# Patient Record
Sex: MALE | Race: BLACK OR AFRICAN AMERICAN | NOT HISPANIC OR LATINO
[De-identification: names, ages, dates, MRNs, and addresses within clinical notes are randomized per-mention and may not be internally consistent; named-entity substitution may affect disease eponyms.]

---

## 2017-01-18 ENCOUNTER — APPOINTMENT (OUTPATIENT)
Dept: COLORECTAL SURGERY | Facility: CLINIC | Age: 62
End: 2017-01-18

## 2017-01-18 VITALS
HEIGHT: 73 IN | RESPIRATION RATE: 16 BRPM | HEART RATE: 80 BPM | BODY MASS INDEX: 31.68 KG/M2 | DIASTOLIC BLOOD PRESSURE: 80 MMHG | WEIGHT: 239 LBS | SYSTOLIC BLOOD PRESSURE: 142 MMHG | TEMPERATURE: 98.1 F

## 2017-01-30 ENCOUNTER — NON-APPOINTMENT (OUTPATIENT)
Age: 62
End: 2017-01-30

## 2017-01-30 ENCOUNTER — APPOINTMENT (OUTPATIENT)
Dept: CARDIOLOGY | Facility: CLINIC | Age: 62
End: 2017-01-30

## 2017-01-30 VITALS
DIASTOLIC BLOOD PRESSURE: 97 MMHG | HEIGHT: 73 IN | OXYGEN SATURATION: 98 % | SYSTOLIC BLOOD PRESSURE: 169 MMHG | HEART RATE: 77 BPM | BODY MASS INDEX: 31.68 KG/M2 | WEIGHT: 239 LBS

## 2017-01-30 VITALS — DIASTOLIC BLOOD PRESSURE: 91 MMHG | SYSTOLIC BLOOD PRESSURE: 153 MMHG

## 2017-02-08 ENCOUNTER — EMERGENCY (EMERGENCY)
Facility: HOSPITAL | Age: 62
LOS: 1 days | Discharge: HOME | End: 2017-02-08
Attending: EMERGENCY MEDICINE
Payer: COMMERCIAL

## 2017-02-08 VITALS
TEMPERATURE: 98 F | RESPIRATION RATE: 20 BRPM | OXYGEN SATURATION: 96 % | HEART RATE: 87 BPM | SYSTOLIC BLOOD PRESSURE: 148 MMHG | WEIGHT: 240.08 LBS | HEIGHT: 73 IN | DIASTOLIC BLOOD PRESSURE: 91 MMHG

## 2017-02-08 DIAGNOSIS — Z86.010 PERSONAL HISTORY OF COLONIC POLYPS: ICD-10-CM

## 2017-02-08 DIAGNOSIS — E78.5 HYPERLIPIDEMIA, UNSPECIFIED: ICD-10-CM

## 2017-02-08 DIAGNOSIS — Z98.890 OTHER SPECIFIED POSTPROCEDURAL STATES: ICD-10-CM

## 2017-02-08 DIAGNOSIS — M25.562 PAIN IN LEFT KNEE: ICD-10-CM

## 2017-02-08 DIAGNOSIS — Z98.83 FILTERING (VITREOUS) BLEB AFTER GLAUCOMA SURGERY STATUS: ICD-10-CM

## 2017-02-08 DIAGNOSIS — H40.11X0 PRIMARY OPEN-ANGLE GLAUCOMA, STAGE UNSPECIFIED: Chronic | ICD-10-CM

## 2017-02-08 PROCEDURE — 73562 X-RAY EXAM OF KNEE 3: CPT | Mod: 26,LT

## 2017-02-08 PROCEDURE — 99283 EMERGENCY DEPT VISIT LOW MDM: CPT

## 2017-02-08 PROCEDURE — 73562 X-RAY EXAM OF KNEE 3: CPT

## 2017-02-08 PROCEDURE — 99283 EMERGENCY DEPT VISIT LOW MDM: CPT | Mod: 25

## 2017-02-08 RX ORDER — CELECOXIB 200 MG/1
1 CAPSULE ORAL
Qty: 20 | Refills: 0 | OUTPATIENT
Start: 2017-02-08 | End: 2017-02-18

## 2017-02-08 RX ORDER — CELECOXIB 200 MG/1
1 CAPSULE ORAL
Qty: 14 | Refills: 0 | OUTPATIENT
Start: 2017-02-08 | End: 2017-02-15

## 2017-02-08 NOTE — ED PROVIDER NOTE - FAMILY HISTORY
Father  Still living? No  Family history of prostate cancer, Age at diagnosis: Age Unknown  Essential hypertension, Age at diagnosis: Age Unknown     Mother  Still living? Yes, Estimated age: Age Unknown  Essential hypertension, Age at diagnosis: Age Unknown  Family history of dementia, Age at diagnosis: Age Unknown

## 2017-02-08 NOTE — ED PROVIDER NOTE - MEDICAL DECISION MAKING DETAILS
62 yr old M presented to ED with left knee pain x 1.5 weeks. Examination + tenderness on palpation. No effusion or swelling noted on inspection . Normal ROM but with pain. X-ray negative for acute fracture or pathology. Pt refused pain medication and was seen by Orthopedic PA. Pt D/C in stable condition with Celebrex and will F/U with Orthopedic clinic as discussed.

## 2017-02-08 NOTE — ED PROVIDER NOTE - OBJECTIVE STATEMENT
62 yr old M presented to ED with left knee pain x 1.5 weeks. Pt explained that he has cartilage loss in both knees and usually see Dr. Carranza. Pt denies nay numbness, weakness or paraesthesia in his left knee. Pt denies any trauma to his knee. Pt also says that he took pain medication and applied anti inflammatory gel to his knee but it did not help his pain. 62 yr old M presented to ED with left knee pain x 1.5 weeks. Pt explained that he has cartilage loss in both knees and usually see Dr. Carranza. Pt denies any numbness, weakness or paraesthesia in his left knee. Pt denies any trauma to his knee. Pt also says that he took pain medication and applied anti inflammatory gel to his knee but it did not help his pain.

## 2017-02-08 NOTE — ED PROVIDER NOTE - PMH
Abdominal hernia without obstruction and without gangrene, recurrence not specified, unspecified hernia type    Anal fissure    Glaucoma    Hemorrhoids, unspecified hemorrhoid type    HLD (hyperlipidemia)    Other constipation    Primary osteoarthritis of both knees    Secondary cataract of left eye, unspecified secondary cataract type

## 2017-02-08 NOTE — ED PROVIDER NOTE - PROGRESS NOTE DETAILS
Pt sent for x-ray and Orthopedic AUDREY Granda has seen patient in Room . Jesus Alberto will notify me about treatment option.

## 2017-02-08 NOTE — ED PROVIDER NOTE - ATTENDING CONTRIBUTION TO CARE
Patient with left knee pain slight decrease range of motion on exam.  Patient able to bear weight in the ER.  Xray shows no acute pathology.  Patient instructed to follow-up with Orthopedics as outpatient.

## 2017-02-08 NOTE — ED PROVIDER NOTE - PHYSICAL EXAMINATION
Left Knee tenderness to palpation and pt is able to bear weight.   pain with ROM and normal pulses in extremity

## 2017-02-08 NOTE — ED PROVIDER NOTE - PSH
Chronic glaucoma  left 2012  right 2016   s/p trabeculectomy  H/O colonoscopy with polypectomy  2012  S/P Hernia Surgery  1974

## 2017-02-08 NOTE — CONSULT NOTE ADULT - SUBJECTIVE AND OBJECTIVE BOX
Pt Name: SINTIA CEDEÑO    MRN: [id number]    Patient is a 62y Male presenting to the emergency department with a chief complaint of left knee pain x 10 days  Patient states he has pain with ambulation and ROM, "has arthritis in both knees"  Patient denies any recent injury or trauma  Denies pain at rest, clicking or locking   Patient currently using voltaren gel and taking advil 200 mg for pain    HEALTH ISSUES - PROBLEM Dx:    REVIEW OF SYSTEMS    Musculoskeletal: pain with ROM	and ambulation	    ROS is otherwise negative.    PAST MEDICAL & SURGICAL HISTORY:  Prediabetes  HLD (hyperlipidemia)  Glaucoma  Chronic glaucoma: OS s/p trabeculectomy  S/P Hernia Surgery    Allergies: No Known Allergies  Medications:     FAMILY HISTORY:  : non-contributory    PHYSICAL EXAM:    Vital Signs Last 24 Hrs  T(C): 36.9, Max: 36.9 (02-08 @ 20:14)  T(F): 98.4, Max: 98.4 (02-08 @ 20:14)  HR: 87 (87 - 87)  BP: 148/91 (148/91 - 148/91)  BP(mean): --  RR: 20 (20 - 20)  SpO2: 96% (96% - 96%)  Daily Height in cm: 185.42 (08 Feb 2017 20:14)    Daily     Appearance: Alert, responsive, in no acute distress.    Neurological: Sensation is grossly intact to light touch. 5/5 motor function of all extremities. No focal deficits or weaknesses found.    Skin: no rash on visible skin. Skin is clean, dry and intact. No bleeding. No abrasions. No ulcerations.    Vascular: 2+ distal pulses. Cap refill < 2 sec. No signs of venous insufficiency or stasis. No extremity ulcerations. No cyanosis.    Musculoskeletal:         Left Lower Extremity: skin intact, no joint effusion, NTTP medial and lateral joint, no clinical signs of infection, 0-110 AROM with pain on terminal flexion, + active plantar/dorsiflexion, calf soft compressible/ NTTP, +2 DP pulse, sensation to light touch intact distally    Imaging Studies:   Xrays left knee negative for acute fracture or dislocation, mild degenerative changes       left knee joint injected with 40 mg depo medrol/10 cc lidocaine  Procedure performed under sterile technique  Patient tolerated well without complication, remained NVI    A/P:  Pt is a 62y Male with left knee pain    Plan per Dr. Benson  * Celebrex 200mg BID  * WBAT LLE  * no restriction with ROM  * compression sleeve as needed for comfort  * Follow up as outpatient with Dr. Benson

## 2017-02-13 ENCOUNTER — OTHER (OUTPATIENT)
Age: 62
End: 2017-02-13

## 2017-02-17 ENCOUNTER — OUTPATIENT (OUTPATIENT)
Dept: OUTPATIENT SERVICES | Facility: HOSPITAL | Age: 62
LOS: 1 days | Discharge: HOME | End: 2017-02-17
Payer: COMMERCIAL

## 2017-02-17 VITALS
TEMPERATURE: 98 F | DIASTOLIC BLOOD PRESSURE: 89 MMHG | HEART RATE: 68 BPM | RESPIRATION RATE: 14 BRPM | WEIGHT: 235.01 LBS | SYSTOLIC BLOOD PRESSURE: 152 MMHG | HEIGHT: 73 IN | OXYGEN SATURATION: 100 %

## 2017-02-17 DIAGNOSIS — Z98.890 OTHER SPECIFIED POSTPROCEDURAL STATES: Chronic | ICD-10-CM

## 2017-02-17 DIAGNOSIS — H40.11X0 PRIMARY OPEN-ANGLE GLAUCOMA, STAGE UNSPECIFIED: Chronic | ICD-10-CM

## 2017-02-17 DIAGNOSIS — K64.8 OTHER HEMORRHOIDS: ICD-10-CM

## 2017-02-17 DIAGNOSIS — K64.4 RESIDUAL HEMORRHOIDAL SKIN TAGS: ICD-10-CM

## 2017-02-17 DIAGNOSIS — Z12.11 ENCOUNTER FOR SCREENING FOR MALIGNANT NEOPLASM OF COLON: ICD-10-CM

## 2017-02-17 DIAGNOSIS — K60.2 ANAL FISSURE, UNSPECIFIED: ICD-10-CM

## 2017-02-17 LAB
ALBUMIN SERPL ELPH-MCNC: 4 G/DL — SIGNIFICANT CHANGE UP (ref 3.3–5)
ALP SERPL-CCNC: 79 U/L — SIGNIFICANT CHANGE UP (ref 40–120)
ALT FLD-CCNC: 19 U/L — SIGNIFICANT CHANGE UP (ref 12–78)
ANION GAP SERPL CALC-SCNC: 5 MMOL/L — SIGNIFICANT CHANGE UP (ref 5–17)
AST SERPL-CCNC: 23 U/L — SIGNIFICANT CHANGE UP (ref 15–37)
BASOPHILS # BLD AUTO: 0.1 K/UL — SIGNIFICANT CHANGE UP (ref 0–0.2)
BASOPHILS NFR BLD AUTO: 1.2 % — SIGNIFICANT CHANGE UP (ref 0–2)
BILIRUB DIRECT SERPL-MCNC: 0.2 MG/DL — SIGNIFICANT CHANGE UP (ref 0–0.2)
BILIRUB SERPL-MCNC: 0.9 MG/DL — SIGNIFICANT CHANGE UP (ref 0.2–1.2)
BUN SERPL-MCNC: 10 MG/DL — SIGNIFICANT CHANGE UP (ref 7–23)
CALCIUM SERPL-MCNC: 8.5 MG/DL — SIGNIFICANT CHANGE UP (ref 8.5–10.1)
CHLORIDE SERPL-SCNC: 108 MMOL/L — SIGNIFICANT CHANGE UP (ref 96–108)
CO2 SERPL-SCNC: 28 MMOL/L — SIGNIFICANT CHANGE UP (ref 22–31)
CREAT SERPL-MCNC: 1.1 MG/DL — SIGNIFICANT CHANGE UP (ref 0.5–1.3)
EOSINOPHIL # BLD AUTO: 0.1 K/UL — SIGNIFICANT CHANGE UP (ref 0–0.5)
EOSINOPHIL NFR BLD AUTO: 2.3 % — SIGNIFICANT CHANGE UP (ref 0–6)
GLUCOSE SERPL-MCNC: 98 MG/DL — SIGNIFICANT CHANGE UP (ref 70–99)
HBA1C BLD-MCNC: 4.8 % — SIGNIFICANT CHANGE UP (ref 4–5.6)
HCT VFR BLD CALC: 49.4 % — SIGNIFICANT CHANGE UP (ref 39–50)
HGB BLD-MCNC: 16.9 G/DL — SIGNIFICANT CHANGE UP (ref 13–17)
INR BLD: 1.2 RATIO — HIGH (ref 0.88–1.16)
LYMPHOCYTES # BLD AUTO: 1.1 K/UL — SIGNIFICANT CHANGE UP (ref 1–3.3)
LYMPHOCYTES # BLD AUTO: 27.6 % — SIGNIFICANT CHANGE UP (ref 13–44)
MCHC RBC-ENTMCNC: 31.1 PG — SIGNIFICANT CHANGE UP (ref 27–34)
MCHC RBC-ENTMCNC: 34.1 GM/DL — SIGNIFICANT CHANGE UP (ref 32–36)
MCV RBC AUTO: 91.2 FL — SIGNIFICANT CHANGE UP (ref 80–100)
MONOCYTES # BLD AUTO: 0.5 K/UL — SIGNIFICANT CHANGE UP (ref 0–0.9)
MONOCYTES NFR BLD AUTO: 12 % — SIGNIFICANT CHANGE UP (ref 2–14)
NEUTROPHILS # BLD AUTO: 2.3 K/UL — SIGNIFICANT CHANGE UP (ref 1.8–7.4)
NEUTROPHILS NFR BLD AUTO: 56.8 % — SIGNIFICANT CHANGE UP (ref 43–77)
PLATELET # BLD AUTO: 197 K/UL — SIGNIFICANT CHANGE UP (ref 150–400)
POTASSIUM SERPL-MCNC: 3.9 MMOL/L — SIGNIFICANT CHANGE UP (ref 3.5–5.3)
POTASSIUM SERPL-SCNC: 3.9 MMOL/L — SIGNIFICANT CHANGE UP (ref 3.5–5.3)
PROT SERPL-MCNC: 7.8 GM/DL — SIGNIFICANT CHANGE UP (ref 6–8.3)
PROTHROM AB SERPL-ACNC: 13.2 SEC — HIGH (ref 10–13.1)
RBC # BLD: 5.42 M/UL — SIGNIFICANT CHANGE UP (ref 4.2–5.8)
RBC # FLD: 11.3 % — SIGNIFICANT CHANGE UP (ref 10.3–14.5)
SODIUM SERPL-SCNC: 141 MMOL/L — SIGNIFICANT CHANGE UP (ref 135–145)
WBC # BLD: 4.1 K/UL — SIGNIFICANT CHANGE UP (ref 3.8–10.5)
WBC # FLD AUTO: 4.1 K/UL — SIGNIFICANT CHANGE UP (ref 3.8–10.5)

## 2017-02-17 PROCEDURE — 93010 ELECTROCARDIOGRAM REPORT: CPT

## 2017-02-17 NOTE — H&P PST ADULT - HISTORY OF PRESENT ILLNESS
63 yo AA male presents to PST prior to proposed procedure. Reports last colonoscopy was 5years ago. Reports "slight tear near the rectum" and occasional blood after BM when wiping. Reports constipation managed with metamucil . Denies rectal pain or abdominal pain.   Now for said procedure.

## 2017-02-17 NOTE — H&P PST ADULT - ASSESSMENT
63 yo AA male scheduled for exam under anesthesia, fissurectomy and hemorrhoidectomy with Dr Ramirez on 2/27/16.    1. Labs as per Dr Ramirez  2. EKG  3. Discussed EZ sponges & day of procedure instructions.

## 2017-02-25 RX ORDER — FAMOTIDINE 10 MG/ML
20 INJECTION INTRAVENOUS ONCE
Qty: 0 | Refills: 0 | Status: COMPLETED | OUTPATIENT
Start: 2017-02-27 | End: 2017-02-27

## 2017-02-25 RX ORDER — METOCLOPRAMIDE HCL 10 MG
10 TABLET ORAL ONCE
Qty: 0 | Refills: 0 | Status: COMPLETED | OUTPATIENT
Start: 2017-02-27 | End: 2017-02-27

## 2017-02-25 RX ORDER — OXYCODONE HYDROCHLORIDE 5 MG/1
10 TABLET ORAL ONCE
Qty: 0 | Refills: 0 | Status: DISCONTINUED | OUTPATIENT
Start: 2017-02-27 | End: 2017-02-27

## 2017-02-26 ENCOUNTER — RESULT REVIEW (OUTPATIENT)
Age: 62
End: 2017-02-26

## 2017-02-27 ENCOUNTER — OUTPATIENT (OUTPATIENT)
Dept: OUTPATIENT SERVICES | Facility: HOSPITAL | Age: 62
LOS: 1 days | Discharge: HOME | End: 2017-02-27
Payer: COMMERCIAL

## 2017-02-27 ENCOUNTER — APPOINTMENT (OUTPATIENT)
Dept: COLORECTAL SURGERY | Facility: HOSPITAL | Age: 62
End: 2017-02-27

## 2017-02-27 VITALS
SYSTOLIC BLOOD PRESSURE: 135 MMHG | HEART RATE: 74 BPM | RESPIRATION RATE: 15 BRPM | TEMPERATURE: 98 F | DIASTOLIC BLOOD PRESSURE: 79 MMHG

## 2017-02-27 VITALS
TEMPERATURE: 98 F | RESPIRATION RATE: 15 BRPM | SYSTOLIC BLOOD PRESSURE: 136 MMHG | HEART RATE: 72 BPM | OXYGEN SATURATION: 100 % | WEIGHT: 240.52 LBS | DIASTOLIC BLOOD PRESSURE: 82 MMHG

## 2017-02-27 DIAGNOSIS — H40.11X0 PRIMARY OPEN-ANGLE GLAUCOMA, STAGE UNSPECIFIED: Chronic | ICD-10-CM

## 2017-02-27 DIAGNOSIS — Z98.890 OTHER SPECIFIED POSTPROCEDURAL STATES: Chronic | ICD-10-CM

## 2017-02-27 PROCEDURE — 46200 REMOVAL OF ANAL FISSURE: CPT

## 2017-02-27 PROCEDURE — 45378 DIAGNOSTIC COLONOSCOPY: CPT

## 2017-02-27 PROCEDURE — 88304 TISSUE EXAM BY PATHOLOGIST: CPT | Mod: 26

## 2017-02-27 RX ORDER — ONDANSETRON 8 MG/1
4 TABLET, FILM COATED ORAL ONCE
Qty: 0 | Refills: 0 | Status: DISCONTINUED | OUTPATIENT
Start: 2017-02-27 | End: 2017-02-27

## 2017-02-27 RX ORDER — OXYCODONE HYDROCHLORIDE 5 MG/1
10 TABLET ORAL EVERY 6 HOURS
Qty: 0 | Refills: 0 | Status: DISCONTINUED | OUTPATIENT
Start: 2017-02-27 | End: 2017-02-27

## 2017-02-27 RX ORDER — FENTANYL CITRATE 50 UG/ML
50 INJECTION INTRAVENOUS
Qty: 0 | Refills: 0 | Status: DISCONTINUED | OUTPATIENT
Start: 2017-02-27 | End: 2017-02-27

## 2017-02-27 RX ORDER — FLUOCINOLONE ACETONIDE 0.25 MG/G
1 CREAM TOPICAL
Qty: 30 | Refills: 1
Start: 2017-02-27 | End: 2017-03-18

## 2017-02-27 RX ORDER — OXYCODONE HYDROCHLORIDE 5 MG/1
1 TABLET ORAL
Qty: 20 | Refills: 0 | OUTPATIENT
Start: 2017-02-27

## 2017-02-27 RX ORDER — SODIUM CHLORIDE 9 MG/ML
1000 INJECTION, SOLUTION INTRAVENOUS
Qty: 0 | Refills: 0 | Status: DISCONTINUED | OUTPATIENT
Start: 2017-02-27 | End: 2017-03-14

## 2017-02-27 RX ORDER — SODIUM CHLORIDE 9 MG/ML
1000 INJECTION, SOLUTION INTRAVENOUS
Qty: 0 | Refills: 0 | Status: DISCONTINUED | OUTPATIENT
Start: 2017-02-27 | End: 2017-02-27

## 2017-02-27 RX ADMIN — OXYCODONE HYDROCHLORIDE 10 MILLIGRAM(S): 5 TABLET ORAL at 09:17

## 2017-02-27 RX ADMIN — Medication 10 MILLIGRAM(S): at 09:17

## 2017-02-27 RX ADMIN — FAMOTIDINE 20 MILLIGRAM(S): 10 INJECTION INTRAVENOUS at 09:17

## 2017-02-27 NOTE — ASU DISCHARGE PLAN (ADULT/PEDIATRIC). - MEDICATION SUMMARY - MEDICATIONS TO TAKE
I will START or STAY ON the medications listed below when I get home from the hospital:    Cialis 5 mg oral tablet  -- 1 tab(s) by mouth once a day  -- Indication: For home med    CeleBREX 200 mg oral capsule  -- 1 cap(s) by mouth 2 times a day  -- Do not take this drug if you are pregnant.  Medication should be taken with plenty of water.  Obtain medical advice before taking any non-prescription drugs as some may affect the action of this medication.  Take with food or milk.    -- Indication: For home med    acetaminophen-oxyCODONE 325 mg-5 mg oral tablet  -- 1 tab(s) by mouth every 6 hours, As Needed -for moderate pain [id number]  -- Caution federal law prohibits the transfer of this drug to any person other  than the person for whom it was prescribed.  May cause drowsiness.  Alcohol may intensify this effect.  Use care when operating dangerous machinery.  This prescription cannot be refilled.  This product contains acetaminophen.  Do not use  with any other product containing acetaminophen to prevent possible liver damage.  Using more of this medication than prescribed may cause serious breathing problems.    -- Indication: For pain    fluocinolone topical 0.025% topical ointment  -- Apply on skin to affected area 2 times a day  -- For external use only.    -- Indication: For ANAL FISSURE/HEMORROHIDS    Metamucil 3.4 g/3.7 g oral powder for reconstitution  -- 1 dose(s) by mouth once a day  -- Indication: For home med

## 2017-02-27 NOTE — ASU DISCHARGE PLAN (ADULT/PEDIATRIC). - NURSING INSTRUCTIONS
Call Dr schneider rfever greater than 101, excessive bleeding,  pain that's getting worse, nausea or vomitting Call Dr wyatt manningever greater than 101, excessive bleeding,  pain that's getting worse, nausea or vomitting  Begin with liquids and light food ( tea, toast, Jello, soups). Advance to what you normally eat. Liquids should taken in adequate amounts today.     CALL the DOCTOR:    -Fever greater than  101F  - Signs  of infection such as : increase pain,swelling,redness,or a bad  smell coming from the wound.  -Excessive amount of bleeding.  - Any pain that appears to be getting worse.  - Vomiting  -  If you have  not urinated 8 hours after surgery or have any difficulty urinating.     A responsible adult should be with you for the rest of the day and night for your safety and to help you if you needed.    Review attached FACT SHEET if applicable.

## 2017-02-27 NOTE — ASU DISCHARGE PLAN (ADULT/PEDIATRIC). - NOTIFY
Bleeding that does not stop/Fever greater than 101/Persistent Nausea and Vomiting/Pain not relieved by Medications/Excessive Diarrhea

## 2017-02-27 NOTE — BRIEF OPERATIVE NOTE - POST-OP DX
Anal fissure  02/27/2017    Active  Spike Escalante  Anal lesion  02/27/2017    Active  Spike Escalante  Diverticulosis of large intestine without hemorrhage  02/27/2017  right side of colon  Active  Spike Escalante

## 2017-03-06 LAB — SURGICAL PATHOLOGY FINAL REPORT - CH: SIGNIFICANT CHANGE UP

## 2017-03-07 DIAGNOSIS — K60.1 CHRONIC ANAL FISSURE: ICD-10-CM

## 2017-03-07 DIAGNOSIS — H40.9 UNSPECIFIED GLAUCOMA: ICD-10-CM

## 2017-03-07 DIAGNOSIS — Z86.010 PERSONAL HISTORY OF COLONIC POLYPS: ICD-10-CM

## 2017-03-07 DIAGNOSIS — K57.30 DIVERTICULOSIS OF LARGE INTESTINE WITHOUT PERFORATION OR ABSCESS WITHOUT BLEEDING: ICD-10-CM

## 2017-03-10 ENCOUNTER — APPOINTMENT (OUTPATIENT)
Dept: CARDIOLOGY | Facility: CLINIC | Age: 62
End: 2017-03-10

## 2017-03-10 VITALS
SYSTOLIC BLOOD PRESSURE: 153 MMHG | HEIGHT: 73 IN | HEART RATE: 92 BPM | WEIGHT: 236 LBS | BODY MASS INDEX: 31.28 KG/M2 | DIASTOLIC BLOOD PRESSURE: 79 MMHG | OXYGEN SATURATION: 97 %

## 2017-03-10 VITALS — DIASTOLIC BLOOD PRESSURE: 91 MMHG | SYSTOLIC BLOOD PRESSURE: 148 MMHG

## 2017-03-10 VITALS — SYSTOLIC BLOOD PRESSURE: 146 MMHG | DIASTOLIC BLOOD PRESSURE: 83 MMHG

## 2017-03-25 DIAGNOSIS — Z01.818 ENCOUNTER FOR OTHER PREPROCEDURAL EXAMINATION: ICD-10-CM

## 2017-03-26 ENCOUNTER — EMERGENCY (EMERGENCY)
Facility: HOSPITAL | Age: 62
LOS: 1 days | Discharge: HOME | End: 2017-03-26
Attending: EMERGENCY MEDICINE
Payer: COMMERCIAL

## 2017-03-26 VITALS
HEART RATE: 89 BPM | OXYGEN SATURATION: 100 % | DIASTOLIC BLOOD PRESSURE: 57 MMHG | RESPIRATION RATE: 20 BRPM | SYSTOLIC BLOOD PRESSURE: 100 MMHG | HEIGHT: 73 IN | WEIGHT: 240.08 LBS | TEMPERATURE: 98 F

## 2017-03-26 DIAGNOSIS — H40.11X0 PRIMARY OPEN-ANGLE GLAUCOMA, STAGE UNSPECIFIED: Chronic | ICD-10-CM

## 2017-03-26 DIAGNOSIS — L50.0 ALLERGIC URTICARIA: ICD-10-CM

## 2017-03-26 DIAGNOSIS — Z98.890 OTHER SPECIFIED POSTPROCEDURAL STATES: ICD-10-CM

## 2017-03-26 DIAGNOSIS — E78.5 HYPERLIPIDEMIA, UNSPECIFIED: ICD-10-CM

## 2017-03-26 DIAGNOSIS — Z98.890 OTHER SPECIFIED POSTPROCEDURAL STATES: Chronic | ICD-10-CM

## 2017-03-26 PROCEDURE — 99283 EMERGENCY DEPT VISIT LOW MDM: CPT

## 2017-03-26 PROCEDURE — 99283 EMERGENCY DEPT VISIT LOW MDM: CPT | Mod: 25

## 2017-03-26 RX ORDER — DIPHENHYDRAMINE HCL 50 MG
2 CAPSULE ORAL
Qty: 40 | Refills: 0
Start: 2017-03-26 | End: 2017-03-31

## 2017-03-26 RX ORDER — DIPHENHYDRAMINE HCL 50 MG
50 CAPSULE ORAL EVERY 6 HOURS
Qty: 0 | Refills: 0 | Status: DISCONTINUED | OUTPATIENT
Start: 2017-03-26 | End: 2017-03-30

## 2017-03-26 RX ADMIN — Medication 50 MILLIGRAM(S): at 22:53

## 2017-03-26 NOTE — ED ADULT NURSE NOTE - OBJECTIVE STATEMENT
Pt received in A-2 s/p allergic reaction. Pt presents to [facility] with redness from head to toe with c/o itchiness, burning, and "pins and needles" like sensation 5 minutes after eating a quarter pounder from [employer]. Pt was also exposed to cleaning supplies today prior to eating [employer]. Pt with no audible wheezing, difficulty swallowing, tongue swelling, SOB, dyspnea. Pt has an area of blanchable hives to Left lower extremity, the rest of the body is free from hives but has a redness color to the skin. Pt is a&ox3, speaking coherently, and following commands. Pt with no further complaints.

## 2017-03-26 NOTE — ED PROVIDER NOTE - NS ED MD SCRIBE ATTENDING SCRIBE SECTIONS
PHYSICAL EXAM/VITAL SIGNS( Pullset)/REVIEW OF SYSTEMS/INTAKE ASSESSMENT/SCREENINGS/HISTORY OF PRESENT ILLNESS/DISPOSITION/HIV/PAST MEDICAL/SURGICAL/SOCIAL HISTORY

## 2017-03-26 NOTE — ED PROVIDER NOTE - CONSTITUTIONAL, MLM
normal... Well nourished, awake, alert, oriented to person, place, time/situation and in no apparent distress.

## 2017-03-26 NOTE — ED ADULT NURSE NOTE - CHPI ED SYMPTOMS NEG
no cough/no difficulty swallowing/no throat itching/no difficulty breathing/no shortness of breath/no swelling of face, tongue/no wheezing

## 2017-03-26 NOTE — ED PROVIDER NOTE - OBJECTIVE STATEMENT
61 y/o male presents to ED, with wife at bedside, c/o erythematous, pruritic, rash on bilateral UE and LE (currently LE>UE) onset today. 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 After eating pt reported he started feeling itchy and looked red; the rash was worse in his UE. Pt went to take a shower and then reported to the ED. Here the rash in his UE has subsided slighted and now the rash is worse in LE. The wife, who also has the food, reports that she is fine. Denies hx of DM. Denies HA, dizziness, numbness, photophobia, diplopia, change in vision/hearing/gait/mental status/speech, focal weakness, neck pain, fever, chills, stiffness, abdominal pain, hx of DVT/PE, leg swelling, CP, SOB, palpitations, diaphoresis, N/V/D/C, change in urinary/bowel function, dysuria, hematuria, flank pain, malaise, or motor/sensory deficits. No further complaints at this time.

## 2017-03-28 ENCOUNTER — APPOINTMENT (OUTPATIENT)
Dept: INTERNAL MEDICINE | Facility: CLINIC | Age: 62
End: 2017-03-28

## 2017-03-28 ENCOUNTER — APPOINTMENT (OUTPATIENT)
Dept: ORTHOPEDIC SURGERY | Facility: CLINIC | Age: 62
End: 2017-03-28

## 2017-03-28 VITALS
DIASTOLIC BLOOD PRESSURE: 90 MMHG | WEIGHT: 236 LBS | BODY MASS INDEX: 31.97 KG/M2 | SYSTOLIC BLOOD PRESSURE: 145 MMHG | HEIGHT: 72 IN | RESPIRATION RATE: 14 BRPM | HEART RATE: 72 BPM

## 2017-03-28 VITALS
WEIGHT: 236 LBS | BODY MASS INDEX: 31.97 KG/M2 | HEART RATE: 73 BPM | TEMPERATURE: 97.9 F | DIASTOLIC BLOOD PRESSURE: 83 MMHG | HEIGHT: 72 IN | SYSTOLIC BLOOD PRESSURE: 146 MMHG

## 2017-03-28 DIAGNOSIS — R73.09 OTHER ABNORMAL GLUCOSE: ICD-10-CM

## 2017-03-28 DIAGNOSIS — K62.5 HEMORRHAGE OF ANUS AND RECTUM: ICD-10-CM

## 2017-03-28 RX ORDER — ASPIRIN ENTERIC COATED TABLETS 81 MG 81 MG/1
81 TABLET, DELAYED RELEASE ORAL DAILY
Refills: 0 | Status: DISCONTINUED | COMMUNITY
Start: 2017-01-30 | End: 2017-03-28

## 2017-04-04 ENCOUNTER — APPOINTMENT (OUTPATIENT)
Dept: ORTHOPEDIC SURGERY | Facility: CLINIC | Age: 62
End: 2017-04-04

## 2017-04-10 ENCOUNTER — APPOINTMENT (OUTPATIENT)
Dept: CHRONIC DISEASE MANAGEMENT | Facility: CLINIC | Age: 62
End: 2017-04-10

## 2017-04-10 VITALS — WEIGHT: 240 LBS | BODY MASS INDEX: 32.51 KG/M2 | HEIGHT: 72 IN

## 2017-04-11 ENCOUNTER — APPOINTMENT (OUTPATIENT)
Dept: COLORECTAL SURGERY | Facility: CLINIC | Age: 62
End: 2017-04-11

## 2017-04-11 ENCOUNTER — APPOINTMENT (OUTPATIENT)
Dept: ORTHOPEDIC SURGERY | Facility: CLINIC | Age: 62
End: 2017-04-11

## 2017-04-11 VITALS
TEMPERATURE: 98.1 F | SYSTOLIC BLOOD PRESSURE: 132 MMHG | BODY MASS INDEX: 31.15 KG/M2 | HEART RATE: 72 BPM | DIASTOLIC BLOOD PRESSURE: 74 MMHG | HEIGHT: 72 IN | WEIGHT: 230 LBS

## 2017-04-17 ENCOUNTER — APPOINTMENT (OUTPATIENT)
Dept: INTERNAL MEDICINE | Facility: CLINIC | Age: 62
End: 2017-04-17

## 2017-04-17 VITALS
WEIGHT: 234 LBS | SYSTOLIC BLOOD PRESSURE: 130 MMHG | BODY MASS INDEX: 31.69 KG/M2 | DIASTOLIC BLOOD PRESSURE: 80 MMHG | HEART RATE: 74 BPM | HEIGHT: 72 IN

## 2017-04-17 RX ORDER — LOSARTAN POTASSIUM 50 MG/1
50 TABLET, FILM COATED ORAL DAILY
Qty: 30 | Refills: 3 | Status: DISCONTINUED | COMMUNITY
Start: 2017-03-10 | End: 2017-04-17

## 2017-04-17 RX ORDER — HYALURONATE SODIUM 20 MG/2 ML
20 SYRINGE (ML) INTRAARTICULAR
Qty: 6 | Refills: 0 | Status: DISCONTINUED | OUTPATIENT
Start: 2017-02-13 | End: 2017-04-17

## 2017-04-27 ENCOUNTER — RESULT REVIEW (OUTPATIENT)
Age: 62
End: 2017-04-27

## 2017-05-02 ENCOUNTER — APPOINTMENT (OUTPATIENT)
Dept: ORTHOPEDIC SURGERY | Facility: CLINIC | Age: 62
End: 2017-05-02

## 2017-05-02 VITALS
HEART RATE: 80 BPM | WEIGHT: 234 LBS | DIASTOLIC BLOOD PRESSURE: 85 MMHG | BODY MASS INDEX: 31.69 KG/M2 | SYSTOLIC BLOOD PRESSURE: 147 MMHG | HEIGHT: 72 IN

## 2017-05-10 ENCOUNTER — APPOINTMENT (OUTPATIENT)
Dept: ORTHOPEDIC SURGERY | Facility: CLINIC | Age: 62
End: 2017-05-10
Payer: COMMERCIAL

## 2017-05-10 PROCEDURE — 20610 DRAIN/INJ JOINT/BURSA W/O US: CPT | Mod: RT

## 2017-05-16 ENCOUNTER — APPOINTMENT (OUTPATIENT)
Dept: ORTHOPEDIC SURGERY | Facility: CLINIC | Age: 62
End: 2017-05-16

## 2017-05-16 VITALS
SYSTOLIC BLOOD PRESSURE: 145 MMHG | BODY MASS INDEX: 31.69 KG/M2 | WEIGHT: 234 LBS | HEART RATE: 81 BPM | DIASTOLIC BLOOD PRESSURE: 87 MMHG | HEIGHT: 72 IN

## 2017-05-17 ENCOUNTER — OTHER (OUTPATIENT)
Age: 62
End: 2017-05-17

## 2017-05-19 ENCOUNTER — APPOINTMENT (OUTPATIENT)
Dept: INTERNAL MEDICINE | Facility: CLINIC | Age: 62
End: 2017-05-19

## 2017-06-15 ENCOUNTER — APPOINTMENT (OUTPATIENT)
Dept: INTERNAL MEDICINE | Facility: CLINIC | Age: 62
End: 2017-06-15

## 2017-06-15 VITALS
SYSTOLIC BLOOD PRESSURE: 140 MMHG | WEIGHT: 231 LBS | BODY MASS INDEX: 31.29 KG/M2 | HEART RATE: 75 BPM | DIASTOLIC BLOOD PRESSURE: 82 MMHG | HEIGHT: 72 IN

## 2017-06-15 RX ORDER — METHYLPREDNISOLONE 4 MG/1
4 TABLET ORAL
Qty: 1 | Refills: 0 | Status: DISCONTINUED | COMMUNITY
Start: 2017-04-17 | End: 2017-06-15

## 2017-06-15 RX ORDER — HYALURONATE SODIUM 20 MG/2 ML
20 SYRINGE (ML) INTRAARTICULAR
Qty: 6 | Refills: 0 | Status: DISCONTINUED | OUTPATIENT
Start: 2017-04-04 | End: 2017-06-15

## 2017-06-15 RX ORDER — HYDROCORTISONE ACETATE 25 MG/1
25 SUPPOSITORY RECTAL TWICE DAILY
Qty: 60 | Refills: 4 | Status: DISCONTINUED | COMMUNITY
Start: 2017-04-11 | End: 2017-06-15

## 2017-06-15 RX ORDER — AMLODIPINE BESYLATE 5 MG/1
5 TABLET ORAL DAILY
Qty: 30 | Refills: 5 | Status: DISCONTINUED | COMMUNITY
Start: 2017-04-17 | End: 2017-06-15

## 2017-06-15 RX ORDER — LIRAGLUTIDE 6 MG/ML
18 INJECTION, SOLUTION SUBCUTANEOUS
Qty: 30 | Refills: 1 | Status: DISCONTINUED | COMMUNITY
Start: 2017-03-10 | End: 2017-06-15

## 2017-06-26 ENCOUNTER — RX RENEWAL (OUTPATIENT)
Age: 62
End: 2017-06-26

## 2017-07-01 RX ORDER — AMLODIPINE BESYLATE 5 MG/1
5 TABLET ORAL
Qty: 90 | Refills: 1 | Status: DISCONTINUED | COMMUNITY
Start: 2017-06-15 | End: 2017-07-01

## 2017-07-05 ENCOUNTER — APPOINTMENT (OUTPATIENT)
Dept: INTERNAL MEDICINE | Facility: CLINIC | Age: 62
End: 2017-07-05

## 2017-07-05 VITALS
WEIGHT: 238 LBS | DIASTOLIC BLOOD PRESSURE: 80 MMHG | BODY MASS INDEX: 32.23 KG/M2 | RESPIRATION RATE: 14 BRPM | HEIGHT: 72 IN | SYSTOLIC BLOOD PRESSURE: 135 MMHG | HEART RATE: 76 BPM

## 2017-07-05 RX ORDER — PREDNISONE 20 MG/1
20 TABLET ORAL
Qty: 10 | Refills: 0 | Status: DISCONTINUED | COMMUNITY
Start: 2017-03-26

## 2017-07-05 RX ORDER — MELOXICAM 15 MG/1
15 TABLET ORAL
Qty: 30 | Refills: 0 | Status: DISCONTINUED | COMMUNITY
Start: 2016-04-22

## 2017-07-05 RX ORDER — FLUOCINOLONE ACETONIDE 0.25 MG/G
0.03 OINTMENT TOPICAL
Qty: 60 | Refills: 0 | Status: DISCONTINUED | COMMUNITY
Start: 2017-02-27

## 2017-07-05 RX ORDER — PREDNISOLONE ACETATE 10 MG/ML
1 SUSPENSION/ DROPS OPHTHALMIC
Qty: 5 | Refills: 0 | Status: DISCONTINUED | COMMUNITY
Start: 2017-03-11

## 2017-07-05 RX ORDER — CELECOXIB 100 MG/1
100 CAPSULE ORAL
Qty: 14 | Refills: 0 | Status: DISCONTINUED | COMMUNITY
Start: 2017-02-08

## 2017-07-05 RX ORDER — CELECOXIB 200 MG/1
200 CAPSULE ORAL
Qty: 20 | Refills: 0 | Status: DISCONTINUED | COMMUNITY
Start: 2017-02-08

## 2017-07-05 RX ORDER — OXYCODONE AND ACETAMINOPHEN 5; 325 MG/1; MG/1
5-325 TABLET ORAL
Qty: 20 | Refills: 0 | Status: DISCONTINUED | COMMUNITY
Start: 2017-02-27

## 2017-07-05 RX ORDER — OFLOXACIN 3 MG/ML
0.3 SOLUTION/ DROPS OPHTHALMIC
Qty: 5 | Refills: 0 | Status: DISCONTINUED | COMMUNITY
Start: 2017-03-11

## 2017-07-05 RX ORDER — AMLODIPINE BESYLATE 10 MG/1
10 TABLET ORAL
Qty: 90 | Refills: 0 | Status: DISCONTINUED | COMMUNITY
Start: 2017-06-15

## 2017-07-05 RX ORDER — DILTIAZEM HYDROCHLORIDE 240 MG/1
240 CAPSULE, EXTENDED RELEASE ORAL
Qty: 30 | Refills: 0 | Status: DISCONTINUED | COMMUNITY
Start: 2017-07-01

## 2017-07-08 RX ORDER — DILTIAZEM HYDROCHLORIDE 240 MG/1
240 CAPSULE, EXTENDED RELEASE ORAL
Qty: 30 | Refills: 5 | Status: DISCONTINUED | COMMUNITY
Start: 2017-07-01 | End: 2017-07-08

## 2017-08-01 ENCOUNTER — APPOINTMENT (OUTPATIENT)
Dept: INTERNAL MEDICINE | Facility: CLINIC | Age: 62
End: 2017-08-01
Payer: COMMERCIAL

## 2017-08-01 VITALS
BODY MASS INDEX: 31.56 KG/M2 | SYSTOLIC BLOOD PRESSURE: 130 MMHG | DIASTOLIC BLOOD PRESSURE: 90 MMHG | HEIGHT: 72 IN | RESPIRATION RATE: 12 BRPM | HEART RATE: 72 BPM | WEIGHT: 233 LBS

## 2017-08-01 PROCEDURE — 99213 OFFICE O/P EST LOW 20 MIN: CPT

## 2017-08-01 RX ORDER — FUROSEMIDE 20 MG/1
20 TABLET ORAL
Qty: 3 | Refills: 0 | Status: DISCONTINUED | COMMUNITY
Start: 2017-07-01 | End: 2017-08-01

## 2017-08-29 ENCOUNTER — APPOINTMENT (OUTPATIENT)
Dept: INTERNAL MEDICINE | Facility: CLINIC | Age: 62
End: 2017-08-29

## 2017-10-06 ENCOUNTER — APPOINTMENT (OUTPATIENT)
Dept: COLORECTAL SURGERY | Facility: CLINIC | Age: 62
End: 2017-10-06

## 2017-11-15 ENCOUNTER — OTHER (OUTPATIENT)
Age: 62
End: 2017-11-15

## 2017-11-29 ENCOUNTER — APPOINTMENT (OUTPATIENT)
Dept: INTERNAL MEDICINE | Facility: CLINIC | Age: 62
End: 2017-11-29
Payer: COMMERCIAL

## 2017-11-29 VITALS
OXYGEN SATURATION: 98 % | SYSTOLIC BLOOD PRESSURE: 125 MMHG | WEIGHT: 234 LBS | HEART RATE: 69 BPM | RESPIRATION RATE: 14 BRPM | HEIGHT: 72 IN | BODY MASS INDEX: 31.69 KG/M2 | DIASTOLIC BLOOD PRESSURE: 80 MMHG

## 2017-11-29 DIAGNOSIS — Z87.2 PERSONAL HISTORY OF DISEASES OF THE SKIN AND SUBCUTANEOUS TISSUE: ICD-10-CM

## 2017-11-29 DIAGNOSIS — K64.4 RESIDUAL HEMORRHOIDAL SKIN TAGS: ICD-10-CM

## 2017-11-29 DIAGNOSIS — K64.8 RESIDUAL HEMORRHOIDAL SKIN TAGS: ICD-10-CM

## 2017-11-29 DIAGNOSIS — M25.569 PAIN IN UNSPECIFIED KNEE: ICD-10-CM

## 2017-11-29 DIAGNOSIS — Z87.39 PERSONAL HISTORY OF OTHER DISEASES OF THE MUSCULOSKELETAL SYSTEM AND CONNECTIVE TISSUE: ICD-10-CM

## 2017-11-29 DIAGNOSIS — R60.0 LOCALIZED EDEMA: ICD-10-CM

## 2017-11-29 PROCEDURE — 99214 OFFICE O/P EST MOD 30 MIN: CPT | Mod: 25

## 2017-11-29 PROCEDURE — 36415 COLL VENOUS BLD VENIPUNCTURE: CPT

## 2017-12-01 ENCOUNTER — APPOINTMENT (OUTPATIENT)
Dept: COLORECTAL SURGERY | Facility: CLINIC | Age: 62
End: 2017-12-01
Payer: COMMERCIAL

## 2017-12-01 VITALS
BODY MASS INDEX: 31.69 KG/M2 | WEIGHT: 234 LBS | HEIGHT: 72 IN | DIASTOLIC BLOOD PRESSURE: 76 MMHG | HEART RATE: 72 BPM | SYSTOLIC BLOOD PRESSURE: 124 MMHG | TEMPERATURE: 98.1 F

## 2017-12-01 DIAGNOSIS — K60.2 ANAL FISSURE, UNSPECIFIED: ICD-10-CM

## 2017-12-01 PROCEDURE — 99214 OFFICE O/P EST MOD 30 MIN: CPT | Mod: 25

## 2017-12-01 PROCEDURE — 46942 TREATMENT OF ANAL FISSURE: CPT

## 2017-12-05 LAB
ANION GAP SERPL CALC-SCNC: 16 MMOL/L
BUN SERPL-MCNC: 10 MG/DL
CALCIUM SERPL-MCNC: 9.2 MG/DL
CHLORIDE SERPL-SCNC: 103 MMOL/L
CO2 SERPL-SCNC: 24 MMOL/L
CREAT SERPL-MCNC: 1.17 MG/DL
GLUCOSE SERPL-MCNC: 97 MG/DL
POTASSIUM SERPL-SCNC: 4.1 MMOL/L
SODIUM SERPL-SCNC: 143 MMOL/L

## 2017-12-21 ENCOUNTER — OTHER (OUTPATIENT)
Age: 62
End: 2017-12-21

## 2017-12-27 ENCOUNTER — OTHER (OUTPATIENT)
Age: 62
End: 2017-12-27

## 2018-01-02 ENCOUNTER — RX RENEWAL (OUTPATIENT)
Age: 63
End: 2018-01-02

## 2018-01-22 ENCOUNTER — APPOINTMENT (OUTPATIENT)
Dept: ORTHOPEDIC SURGERY | Facility: CLINIC | Age: 63
End: 2018-01-22

## 2018-01-26 ENCOUNTER — APPOINTMENT (OUTPATIENT)
Dept: DERMATOLOGY | Facility: CLINIC | Age: 63
End: 2018-01-26
Payer: COMMERCIAL

## 2018-01-26 PROCEDURE — 99203 OFFICE O/P NEW LOW 30 MIN: CPT

## 2018-01-29 ENCOUNTER — APPOINTMENT (OUTPATIENT)
Dept: ORTHOPEDIC SURGERY | Facility: CLINIC | Age: 63
End: 2018-01-29
Payer: COMMERCIAL

## 2018-01-29 VITALS
DIASTOLIC BLOOD PRESSURE: 83 MMHG | SYSTOLIC BLOOD PRESSURE: 151 MMHG | BODY MASS INDEX: 31.69 KG/M2 | HEIGHT: 72 IN | HEART RATE: 94 BPM | WEIGHT: 234 LBS

## 2018-01-29 PROCEDURE — 99213 OFFICE O/P EST LOW 20 MIN: CPT | Mod: 25

## 2018-01-29 PROCEDURE — 20610 DRAIN/INJ JOINT/BURSA W/O US: CPT | Mod: 50

## 2018-02-05 ENCOUNTER — APPOINTMENT (OUTPATIENT)
Dept: ORTHOPEDIC SURGERY | Facility: CLINIC | Age: 63
End: 2018-02-05
Payer: COMMERCIAL

## 2018-02-05 VITALS
SYSTOLIC BLOOD PRESSURE: 154 MMHG | BODY MASS INDEX: 31.69 KG/M2 | DIASTOLIC BLOOD PRESSURE: 87 MMHG | HEIGHT: 72 IN | WEIGHT: 234 LBS | HEART RATE: 88 BPM

## 2018-02-05 PROCEDURE — 20610 DRAIN/INJ JOINT/BURSA W/O US: CPT | Mod: 50

## 2018-02-06 NOTE — ASU DISCHARGE PLAN (ADULT/PEDIATRIC). - DRESSING FT
docusate sodium (COLACE) 100 mg 1 cap daily   Last refill: 3/29/17 #28 refill 12  Last office visit: 12/11/17    Script called in. I talked with Lashonda Farris at [facility]. change gauze daily and as needed.

## 2018-02-13 ENCOUNTER — APPOINTMENT (OUTPATIENT)
Dept: ORTHOPEDIC SURGERY | Facility: CLINIC | Age: 63
End: 2018-02-13
Payer: COMMERCIAL

## 2018-02-13 VITALS
HEIGHT: 72 IN | DIASTOLIC BLOOD PRESSURE: 93 MMHG | HEART RATE: 96 BPM | SYSTOLIC BLOOD PRESSURE: 168 MMHG | WEIGHT: 234 LBS | BODY MASS INDEX: 31.69 KG/M2

## 2018-02-13 PROCEDURE — 20610 DRAIN/INJ JOINT/BURSA W/O US: CPT | Mod: 50

## 2018-03-16 ENCOUNTER — APPOINTMENT (OUTPATIENT)
Dept: COLORECTAL SURGERY | Facility: CLINIC | Age: 63
End: 2018-03-16

## 2018-03-22 ENCOUNTER — APPOINTMENT (OUTPATIENT)
Dept: INTERNAL MEDICINE | Facility: CLINIC | Age: 63
End: 2018-03-22

## 2018-06-04 ENCOUNTER — APPOINTMENT (OUTPATIENT)
Dept: INTERNAL MEDICINE | Facility: CLINIC | Age: 63
End: 2018-06-04
Payer: COMMERCIAL

## 2018-06-04 ENCOUNTER — NON-APPOINTMENT (OUTPATIENT)
Age: 63
End: 2018-06-04

## 2018-06-04 VITALS
BODY MASS INDEX: 32.37 KG/M2 | HEIGHT: 72 IN | HEART RATE: 70 BPM | RESPIRATION RATE: 14 BRPM | WEIGHT: 239 LBS | SYSTOLIC BLOOD PRESSURE: 126 MMHG | DIASTOLIC BLOOD PRESSURE: 80 MMHG

## 2018-06-04 DIAGNOSIS — H26.9 UNSPECIFIED CATARACT: ICD-10-CM

## 2018-06-04 PROCEDURE — 99213 OFFICE O/P EST LOW 20 MIN: CPT | Mod: 25

## 2018-06-04 PROCEDURE — 93000 ELECTROCARDIOGRAM COMPLETE: CPT

## 2018-06-04 NOTE — HISTORY OF PRESENT ILLNESS
[Coronary Artery Disease] : no coronary artery disease [Diabetes] : no diabetes [Sleep Apnea] : no sleep apnea [COPD] : no COPD [Previous Adverse Anesthesia Reaction] : no previous adverse anesthesia reaction [Hypertension] : ~T hypertension [( Patient denies any chest pain, claudication, dyspnea on exertion, orthopnea, palpitations or syncope )] : Patient denies any chest pain, claudication, dyspnea on exertion, orthopnea, palpitations or syncope. [FreeTextEntry1] : Left cataract [FreeTextEntry2] : June 14, 2018 [FreeTextEntry4] : 63-year-old male presents for evaluation prior to left cataract surgery.\par \par Patient general medical health is good with history of hypertension for which she takes losartan and hyperlipidemia.\par \par Otherwise patient general health is good without any history of cardiopulmonary, hepatorenal, hematological or diabetes.\par \par Patient is feeling well without complaints including no chest pain, palpitations, shortness of breath or edema.

## 2018-06-04 NOTE — PHYSICAL EXAM
[No Acute Distress] : no acute distress [Well-Appearing] : well-appearing [Normal Sclera/Conjunctiva] : normal sclera/conjunctiva [No JVD] : no jugular venous distention [No Respiratory Distress] : no respiratory distress  [Clear to Auscultation] : lungs were clear to auscultation bilaterally [Normal Rate] : normal rate  [Regular Rhythm] : with a regular rhythm [Normal S1, S2] : normal S1 and S2 [No Murmur] : no murmur heard [No Edema] : there was no peripheral edema [Soft] : abdomen soft [Non Tender] : non-tender [No Focal Deficits] : no focal deficits [Normal Affect] : the affect was normal

## 2018-06-04 NOTE — ASSESSMENT
[Patient Optimized for Surgery] : Patient optimized for surgery [No Further Testing Recommended] : no further testing recommended [Continue medications as is] : Continue current medications [FreeTextEntry4] : 63-year-old male with good general health with controlled hypertension therefore no contraindication to planned low risk procedure.

## 2018-06-13 ENCOUNTER — TRANSCRIPTION ENCOUNTER (OUTPATIENT)
Age: 63
End: 2018-06-13

## 2018-06-14 ENCOUNTER — OUTPATIENT (OUTPATIENT)
Dept: OUTPATIENT SERVICES | Facility: HOSPITAL | Age: 63
LOS: 1 days | End: 2018-06-14
Payer: COMMERCIAL

## 2018-06-14 VITALS
DIASTOLIC BLOOD PRESSURE: 83 MMHG | TEMPERATURE: 99 F | WEIGHT: 234.13 LBS | SYSTOLIC BLOOD PRESSURE: 146 MMHG | RESPIRATION RATE: 20 BRPM | OXYGEN SATURATION: 99 % | HEART RATE: 75 BPM | HEIGHT: 73.5 IN

## 2018-06-14 VITALS
RESPIRATION RATE: 17 BRPM | DIASTOLIC BLOOD PRESSURE: 89 MMHG | SYSTOLIC BLOOD PRESSURE: 139 MMHG | OXYGEN SATURATION: 100 % | HEART RATE: 67 BPM

## 2018-06-14 DIAGNOSIS — Z98.890 OTHER SPECIFIED POSTPROCEDURAL STATES: Chronic | ICD-10-CM

## 2018-06-14 DIAGNOSIS — H25.812 COMBINED FORMS OF AGE-RELATED CATARACT, LEFT EYE: ICD-10-CM

## 2018-06-14 DIAGNOSIS — H40.11X0 PRIMARY OPEN-ANGLE GLAUCOMA, STAGE UNSPECIFIED: Chronic | ICD-10-CM

## 2018-06-14 PROCEDURE — V2787: CPT

## 2018-06-14 PROCEDURE — 66984 XCAPSL CTRC RMVL W/O ECP: CPT | Mod: LT

## 2018-06-14 NOTE — ASU DISCHARGE PLAN (ADULT/PEDIATRIC). - NOTIFY
Fever greater than 101/Inability to Tolerate Liquids or Foods/Pain not relieved by Medications/Bleeding that does not stop/Persistent Nausea and Vomiting

## 2018-06-14 NOTE — ASU DISCHARGE PLAN (ADULT/PEDIATRIC). - SPECIAL INSTRUCTIONS
Your eye patch will remain on overnight. Your doctor will remove it at your appointment tomorrow and instruct you on what drops to take at that time. Continue drops as usual in the other eye. Bring the eye kit and all of your other eye drops to the office for each visit. You may experience some itching or scratchiness following surgery. This is normal and is usually relieved with Tylenol which can be taken 650mg by mouth every 4-6 hours for pain. Avoid Aspirin or Motrin. If you experience persistent decrease in vision, pain, excessive bleeding , temperature above 101, persistent nausea or vomiting contact your surgeon at [phone].

## 2018-06-25 ENCOUNTER — RX RENEWAL (OUTPATIENT)
Age: 63
End: 2018-06-25

## 2018-06-29 ENCOUNTER — NON-APPOINTMENT (OUTPATIENT)
Age: 63
End: 2018-06-29

## 2018-06-29 ENCOUNTER — APPOINTMENT (OUTPATIENT)
Dept: CARDIOLOGY | Facility: CLINIC | Age: 63
End: 2018-06-29
Payer: COMMERCIAL

## 2018-06-29 VITALS
SYSTOLIC BLOOD PRESSURE: 164 MMHG | RESPIRATION RATE: 18 BRPM | HEART RATE: 91 BPM | OXYGEN SATURATION: 100 % | DIASTOLIC BLOOD PRESSURE: 80 MMHG

## 2018-06-29 VITALS — DIASTOLIC BLOOD PRESSURE: 92 MMHG | SYSTOLIC BLOOD PRESSURE: 180 MMHG

## 2018-06-29 DIAGNOSIS — N52.9 MALE ERECTILE DYSFUNCTION, UNSPECIFIED: ICD-10-CM

## 2018-06-29 PROCEDURE — 93000 ELECTROCARDIOGRAM COMPLETE: CPT

## 2018-06-29 PROCEDURE — 99214 OFFICE O/P EST MOD 30 MIN: CPT

## 2018-09-17 ENCOUNTER — APPOINTMENT (OUTPATIENT)
Dept: CARDIOLOGY | Facility: CLINIC | Age: 63
End: 2018-09-17
Payer: COMMERCIAL

## 2018-09-17 PROCEDURE — 93015 CV STRESS TEST SUPVJ I&R: CPT

## 2018-09-18 ENCOUNTER — RESULT REVIEW (OUTPATIENT)
Age: 63
End: 2018-09-18

## 2018-10-04 PROBLEM — H26.40 UNSPECIFIED SECONDARY CATARACT: Chronic | Status: ACTIVE | Noted: 2017-02-17

## 2018-10-04 PROBLEM — K60.2 ANAL FISSURE, UNSPECIFIED: Chronic | Status: ACTIVE | Noted: 2017-02-17

## 2018-10-04 PROBLEM — K64.9 UNSPECIFIED HEMORRHOIDS: Chronic | Status: ACTIVE | Noted: 2017-02-17

## 2018-10-04 PROBLEM — K59.09 OTHER CONSTIPATION: Chronic | Status: ACTIVE | Noted: 2017-02-17

## 2018-10-04 PROBLEM — M17.0 BILATERAL PRIMARY OSTEOARTHRITIS OF KNEE: Chronic | Status: ACTIVE | Noted: 2017-02-17

## 2018-10-04 PROBLEM — K46.9 UNSPECIFIED ABDOMINAL HERNIA WITHOUT OBSTRUCTION OR GANGRENE: Chronic | Status: ACTIVE | Noted: 2017-02-17

## 2018-10-05 ENCOUNTER — APPOINTMENT (OUTPATIENT)
Dept: INTERNAL MEDICINE | Facility: CLINIC | Age: 63
End: 2018-10-05
Payer: COMMERCIAL

## 2018-10-05 VITALS
SYSTOLIC BLOOD PRESSURE: 135 MMHG | HEIGHT: 72 IN | WEIGHT: 240 LBS | BODY MASS INDEX: 32.51 KG/M2 | DIASTOLIC BLOOD PRESSURE: 85 MMHG | TEMPERATURE: 98.1 F | HEART RATE: 88 BPM

## 2018-10-05 PROCEDURE — 99214 OFFICE O/P EST MOD 30 MIN: CPT

## 2018-10-05 NOTE — PHYSICAL EXAM
[No Acute Distress] : no acute distress [No Respiratory Distress] : no respiratory distress  [Clear to Auscultation] : lungs were clear to auscultation bilaterally [Normal Rate] : normal rate  [Regular Rhythm] : with a regular rhythm [Soft] : abdomen soft [Non Tender] : non-tender [No Masses] : no abdominal mass palpated [No CVA Tenderness] : no CVA  tenderness [No Rash] : no rash [Normal Affect] : the affect was normal [Normal Mood] : the mood was normal [de-identified] : no guarding [de-identified] : +Reproducible pain left lower back, symptoms are worse with twisting and movement up and down the table

## 2018-10-05 NOTE — ASSESSMENT
[FreeTextEntry1] : Symptoms appear musculoskeletal so patient advised to apply heat and use supportive therapy i.e. Tylenol. CT of the abdomen and pelvis ordered to rule out any intra-abdominal pathology, UA sent. Patient will call if symptoms persist or worsen and return to the office as scheduled for regular followup

## 2018-10-05 NOTE — HISTORY OF PRESENT ILLNESS
[FreeTextEntry8] : Patient presents complaining of left lower back pain that started about 3-4 weeks ago. Patient states symptoms are on and off and vary in intensity. Patient had no trauma/fall. Patient has not tried any OTC medications. Patient states symptoms appear to be worse with certain movements or changing positions from sitting to standing. Patient has no associated nausea/vomiting/hematuria/dysuria/belly pain, bowels are normal, no radicular pain down the leg and no rash. Patient is very concerned as his friend just got diagnosed with pancreatic cancer

## 2018-10-08 ENCOUNTER — RESULT REVIEW (OUTPATIENT)
Age: 63
End: 2018-10-08

## 2018-10-08 LAB
APPEARANCE: CLEAR
BACTERIA: NEGATIVE
BILIRUBIN URINE: NEGATIVE
BLOOD URINE: NEGATIVE
COLOR: YELLOW
GLUCOSE QUALITATIVE U: NEGATIVE MG/DL
HYALINE CASTS: 0 /LPF
KETONES URINE: NEGATIVE
LEUKOCYTE ESTERASE URINE: NEGATIVE
MICROSCOPIC-UA: NORMAL
NITRITE URINE: NEGATIVE
PH URINE: 7
PROTEIN URINE: NEGATIVE MG/DL
RED BLOOD CELLS URINE: 0 /HPF
SPECIFIC GRAVITY URINE: 1.01
SQUAMOUS EPITHELIAL CELLS: 0 /HPF
UROBILINOGEN URINE: NEGATIVE MG/DL
WHITE BLOOD CELLS URINE: 0 /HPF

## 2018-10-11 ENCOUNTER — FORM ENCOUNTER (OUTPATIENT)
Age: 63
End: 2018-10-11

## 2018-10-12 ENCOUNTER — RESULT REVIEW (OUTPATIENT)
Age: 63
End: 2018-10-12

## 2018-10-12 ENCOUNTER — APPOINTMENT (OUTPATIENT)
Dept: CT IMAGING | Facility: CLINIC | Age: 63
End: 2018-10-12
Payer: COMMERCIAL

## 2018-10-12 ENCOUNTER — OUTPATIENT (OUTPATIENT)
Dept: OUTPATIENT SERVICES | Facility: HOSPITAL | Age: 63
LOS: 1 days | End: 2018-10-12
Payer: COMMERCIAL

## 2018-10-12 DIAGNOSIS — M54.5 LOW BACK PAIN: ICD-10-CM

## 2018-10-12 DIAGNOSIS — H40.11X0 PRIMARY OPEN-ANGLE GLAUCOMA, STAGE UNSPECIFIED: Chronic | ICD-10-CM

## 2018-10-12 DIAGNOSIS — Z98.890 OTHER SPECIFIED POSTPROCEDURAL STATES: Chronic | ICD-10-CM

## 2018-10-12 PROCEDURE — 82565 ASSAY OF CREATININE: CPT

## 2018-10-12 PROCEDURE — 74177 CT ABD & PELVIS W/CONTRAST: CPT

## 2018-10-12 PROCEDURE — 74177 CT ABD & PELVIS W/CONTRAST: CPT | Mod: 26

## 2018-11-27 ENCOUNTER — OTHER (OUTPATIENT)
Age: 63
End: 2018-11-27

## 2019-01-25 ENCOUNTER — APPOINTMENT (OUTPATIENT)
Dept: INTERNAL MEDICINE | Facility: CLINIC | Age: 64
End: 2019-01-25

## 2019-02-15 ENCOUNTER — APPOINTMENT (OUTPATIENT)
Dept: INTERNAL MEDICINE | Facility: CLINIC | Age: 64
End: 2019-02-15
Payer: COMMERCIAL

## 2019-02-15 VITALS
HEART RATE: 83 BPM | HEIGHT: 72 IN | WEIGHT: 232 LBS | SYSTOLIC BLOOD PRESSURE: 123 MMHG | RESPIRATION RATE: 12 BRPM | BODY MASS INDEX: 31.42 KG/M2 | DIASTOLIC BLOOD PRESSURE: 80 MMHG

## 2019-02-15 PROCEDURE — 99213 OFFICE O/P EST LOW 20 MIN: CPT | Mod: 25

## 2019-02-15 PROCEDURE — 36415 COLL VENOUS BLD VENIPUNCTURE: CPT

## 2019-02-16 LAB
ANION GAP SERPL CALC-SCNC: 11 MMOL/L
BUN SERPL-MCNC: 10 MG/DL
CALCIUM SERPL-MCNC: 9 MG/DL
CHLORIDE SERPL-SCNC: 106 MMOL/L
CO2 SERPL-SCNC: 26 MMOL/L
CREAT SERPL-MCNC: 1.05 MG/DL
GLUCOSE SERPL-MCNC: 95 MG/DL
POTASSIUM SERPL-SCNC: 4.1 MMOL/L
SODIUM SERPL-SCNC: 143 MMOL/L

## 2019-02-16 NOTE — ASSESSMENT
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

## 2019-02-16 NOTE — HISTORY OF PRESENT ILLNESS
[FreeTextEntry8] : The patient presents complaining of intermittent left elbow pain which has been occurring for about 2-3 months.\par There has been no incident such as trauma or occurrence that started the pain.\par The pain increases with certain motions such as lifting objects with the elbow bending.\par No outward sign such as swelling or redness.\par He's tried intermittent Aleve with questionable benefit.\par \par Patient also is hypertensive on losartan without followup for some time.\par

## 2019-02-16 NOTE — PHYSICAL EXAM
[No Acute Distress] : no acute distress [Well-Appearing] : well-appearing [No Respiratory Distress] : no respiratory distress  [Clear to Auscultation] : lungs were clear to auscultation bilaterally [Normal Rate] : normal rate  [Regular Rhythm] : with a regular rhythm [No Focal Deficits] : no focal deficits [Normal Affect] : the affect was normal [de-identified] : Positive exquisite tenderness left lateral epicondyles with normal range of motion and no crepitus.\par No warmth or erythema. Normal range of motion.

## 2019-04-12 ENCOUNTER — APPOINTMENT (OUTPATIENT)
Dept: DERMATOLOGY | Facility: CLINIC | Age: 64
End: 2019-04-12
Payer: COMMERCIAL

## 2019-04-12 ENCOUNTER — APPOINTMENT (OUTPATIENT)
Dept: DERMATOLOGY | Facility: CLINIC | Age: 64
End: 2019-04-12

## 2019-04-12 PROCEDURE — 99213 OFFICE O/P EST LOW 20 MIN: CPT

## 2019-05-06 ENCOUNTER — APPOINTMENT (OUTPATIENT)
Dept: INTERNAL MEDICINE | Facility: CLINIC | Age: 64
End: 2019-05-06

## 2019-06-03 PROBLEM — N52.9 MALE ERECTILE DISORDER: Status: ACTIVE | Noted: 2018-06-29

## 2019-09-04 ENCOUNTER — OTHER (OUTPATIENT)
Age: 64
End: 2019-09-04

## 2019-09-10 ENCOUNTER — OTHER (OUTPATIENT)
Age: 64
End: 2019-09-10

## 2019-10-21 ENCOUNTER — APPOINTMENT (OUTPATIENT)
Dept: CARDIOLOGY | Facility: CLINIC | Age: 64
End: 2019-10-21
Payer: COMMERCIAL

## 2019-10-21 ENCOUNTER — NON-APPOINTMENT (OUTPATIENT)
Age: 64
End: 2019-10-21

## 2019-10-21 VITALS — DIASTOLIC BLOOD PRESSURE: 80 MMHG | SYSTOLIC BLOOD PRESSURE: 162 MMHG

## 2019-10-21 VITALS
BODY MASS INDEX: 33.59 KG/M2 | HEIGHT: 72 IN | SYSTOLIC BLOOD PRESSURE: 154 MMHG | WEIGHT: 248 LBS | OXYGEN SATURATION: 98 % | HEART RATE: 90 BPM | DIASTOLIC BLOOD PRESSURE: 76 MMHG

## 2019-10-21 DIAGNOSIS — N52.9 MALE ERECTILE DYSFUNCTION, UNSPECIFIED: ICD-10-CM

## 2019-10-21 PROCEDURE — 93000 ELECTROCARDIOGRAM COMPLETE: CPT

## 2019-10-21 PROCEDURE — 99214 OFFICE O/P EST MOD 30 MIN: CPT

## 2019-10-21 RX ORDER — DIFLUPREDNATE 0.5 MG/ML
0.05 EMULSION OPHTHALMIC
Qty: 5 | Refills: 0 | Status: DISCONTINUED | COMMUNITY
Start: 2018-06-08 | End: 2019-10-21

## 2019-10-21 RX ORDER — HYALURONATE SODIUM 20 MG/2 ML
20 SYRINGE (ML) INTRAARTICULAR
Qty: 12 | Refills: 0 | Status: DISCONTINUED | OUTPATIENT
Start: 2017-11-15 | End: 2019-10-21

## 2019-10-21 RX ORDER — IBUPROFEN 600 MG/1
600 TABLET, FILM COATED ORAL 3 TIMES DAILY
Qty: 40 | Refills: 0 | Status: DISCONTINUED | COMMUNITY
Start: 2019-02-16 | End: 2019-10-21

## 2019-10-21 RX ORDER — FLUOCINOLONE ACETONIDE 0.25 MG/G
0.03 CREAM TOPICAL TWICE DAILY
Qty: 30 | Refills: 3 | Status: DISCONTINUED | COMMUNITY
Start: 2017-12-01 | End: 2019-10-21

## 2019-10-21 NOTE — HISTORY OF PRESENT ILLNESS
[FreeTextEntry1] : Mr. Chavez is a 64-year-old male who is here today with hypertension, hyperlipidemia, increased weight.  He has elevated PSA which is being evaluated by urologist.\par He has gained weight and is getting a nutritionist.  He has no chest pain or shortness of breath.  No palpitation, syncope or presyncope.\par \par Exercise a stress test in September 2019 demonstrated normal BP by response, blood pressure was mildly elevated on the beginning of the stress test.  There were no EKG changes or symptoms of chest pain or shortness of breath with 10 METS of exercise.\par

## 2019-10-21 NOTE — REVIEW OF SYSTEMS
[Recent Weight Gain (___ Lbs)] : recent [unfilled] ~Ulb weight gain [Feeling Fatigued] : not feeling fatigued [Recent Weight Loss (___ Lbs)] : no recent weight loss [Discharge From The Ears] : no discharge from the ears [Sore Throat] : no sore throat [Shortness Of Breath] : no shortness of breath [Chest Pain] : no chest pain [Lower Ext Edema] : no extremity edema [Palpitations] : no palpitations [Cough] : no cough [Nausea] : no nausea [Heartburn] : no heartburn [Change in Appetite] : no change in appetite [Dysphagia] : no dysphagia [Urinary Frequency] : no change in urinary frequency [Impotence] : no impotence [Joint Pain] : no joint pain [Joint Swelling] : no joint swelling [Skin: A Rash] : no rash: [Skin Lesions] : no skin lesions [Convulsions] : no convulsions [Dizziness] : no dizziness [Anxiety] : no anxiety [Easy Bleeding] : no tendency for easy bleeding [Easy Bruising] : no tendency for easy bruising

## 2019-10-21 NOTE — ASSESSMENT
[FreeTextEntry1] : 64-year-old male with hypertension hyperlipidemia erectile dysfunction, obesity who is trying to lose weight.  His blood pressure is elevated.  He has been taking decongestants due to nasal congestion.

## 2019-10-21 NOTE — DISCUSSION/SUMMARY
[FreeTextEntry1] : Blood pressure is elevated, patient has been taking Sudafed, advised to DC Sudafed.  And use Flonase over-the-counter.\par He will buy a blood pressure machine and check his blood pressure twice a day for the next next 2 weeks and bring me the results\par ASCVD of close to 14%.  Recommended to repeat lipid panel.  May require initiation of a statin therapy.\par Repeat echo with history of MR in 2014 as well as hypertension.\par Patient was advised to partake in 150 minutes of moderate exercise per week.\par patient was advised to see us in 6 months if stable and certainly earlier with any new symptoms.\par \par

## 2019-10-21 NOTE — PHYSICAL EXAM
[Well Groomed] : well groomed [General Appearance - In No Acute Distress] : no acute distress [Normal Conjunctiva] : the conjunctiva exhibited no abnormalities [Normal Oral Mucosa] : normal oral mucosa [No Oral Pallor] : no oral pallor [Normal Oropharynx] : normal oropharynx [Normal Jugular Venous V Waves Present] : normal jugular venous V waves present [Auscultation Breath Sounds / Voice Sounds] : lungs were clear to auscultation bilaterally [Heart Sounds] : normal S1 and S2 [Arterial Pulses Normal] : the arterial pulses were normal [Edema] : no peripheral edema present [Abdomen Soft] : soft [Bowel Sounds] : normal bowel sounds [FreeTextEntry1] : 2/6 daren walker [Abnormal Walk] : normal gait [Gait - Sufficient For Exercise Testing] : the gait was sufficient for exercise testing [Nail Clubbing] : no clubbing of the fingernails [Cyanosis, Localized] : no localized cyanosis [Skin Turgor] : normal skin turgor [] : no rash [Impaired Insight] : insight and judgment were intact [Oriented To Time, Place, And Person] : oriented to person, place, and time

## 2019-10-30 ENCOUNTER — APPOINTMENT (OUTPATIENT)
Dept: CARDIOLOGY | Facility: CLINIC | Age: 64
End: 2019-10-30
Payer: COMMERCIAL

## 2019-10-30 PROCEDURE — 93306 TTE W/DOPPLER COMPLETE: CPT

## 2019-11-01 ENCOUNTER — APPOINTMENT (OUTPATIENT)
Dept: CARDIOLOGY | Facility: CLINIC | Age: 64
End: 2019-11-01
Payer: COMMERCIAL

## 2019-11-01 ENCOUNTER — APPOINTMENT (OUTPATIENT)
Dept: INTERNAL MEDICINE | Facility: CLINIC | Age: 64
End: 2019-11-01

## 2019-11-01 VITALS
WEIGHT: 236 LBS | OXYGEN SATURATION: 98 % | HEART RATE: 98 BPM | BODY MASS INDEX: 31.97 KG/M2 | SYSTOLIC BLOOD PRESSURE: 158 MMHG | DIASTOLIC BLOOD PRESSURE: 84 MMHG | HEIGHT: 72 IN

## 2019-11-01 PROCEDURE — 99214 OFFICE O/P EST MOD 30 MIN: CPT

## 2019-11-01 NOTE — PHYSICAL EXAM
[Well Groomed] : well groomed [General Appearance - In No Acute Distress] : no acute distress [Normal Conjunctiva] : the conjunctiva exhibited no abnormalities [Normal Oral Mucosa] : normal oral mucosa [No Oral Pallor] : no oral pallor [Normal Oropharynx] : normal oropharynx [Normal Jugular Venous V Waves Present] : normal jugular venous V waves present [Auscultation Breath Sounds / Voice Sounds] : lungs were clear to auscultation bilaterally [Heart Sounds] : normal S1 and S2 [Arterial Pulses Normal] : the arterial pulses were normal [Edema] : no peripheral edema present [FreeTextEntry1] : 2/6 daren walker [Bowel Sounds] : normal bowel sounds [Abdomen Soft] : soft [Abnormal Walk] : normal gait [Gait - Sufficient For Exercise Testing] : the gait was sufficient for exercise testing [Nail Clubbing] : no clubbing of the fingernails [Cyanosis, Localized] : no localized cyanosis [Skin Turgor] : normal skin turgor [] : no rash [Oriented To Time, Place, And Person] : oriented to person, place, and time [Impaired Insight] : insight and judgment were intact

## 2019-11-01 NOTE — HISTORY OF PRESENT ILLNESS
[FreeTextEntry1] : Mr. Chavez is here on urgent basis due to elevated blood pressure as high as 180/100 early this morning.  Blood pressure is better now.  He has no chest pain or shortness of breath.  Is compliant with losartan but that was added last night and had salty foods.  He denies any palpitations, syncope or presyncope.  He was previously on amlodipine due to leg edema we discontinued it.\par He also had recent blood work including lipid profile.  Is ASCVD risk score which should be about 7% is close to 15%.\par \par From prior note: Mr. Chavez is a 64-year-old male who is here today with hypertension, hyperlipidemia, increased weight.  He has elevated PSA which is being evaluated by urologist.\par He has gained weight and is getting a nutritionist.  He has no chest pain or shortness of breath.  No palpitation, syncope or presyncope.\par \par Exercise a stress test in September 2019 demonstrated normal BP by response, blood pressure was mildly elevated on the beginning of the stress test.  There were no EKG changes or symptoms of chest pain or shortness of breath with 10 METS of exercise.\par

## 2019-11-01 NOTE — ASSESSMENT
[FreeTextEntry1] : We spoke in length about his test results, treatment of hypertension and hyperlipidemia.  Unfortunately some of the risk factors are aggravated based on his work and lifestyle, eating late at night.  His blood pressure is better now but is not to goal yet.  His book about ALLHAT study, indication to use thiazide like diuretics as well as calcium channel blockers for -Americans.\par He has had side effects on amlodipine.  He will continue on losartan.  I have added chlorthalidone.  He will continue to monitor his blood pressure and call me if it is more than 135/85.  He should stick to a low-salt diet, one that is less than 2 g of sodium per day.  We also spoke about diet, and hyperlipidemia.  I started him on low-dose atorvastatin.  Side effects were discussed with him in length.  I advised him to drink water to the glass of wine at max per day.  He will follow with me in a month or 2.  He has also started to exercise and wants to lose more weight.

## 2019-11-01 NOTE — REVIEW OF SYSTEMS
[Recent Weight Gain (___ Lbs)] : no recent weight gain [Feeling Fatigued] : not feeling fatigued [Recent Weight Loss (___ Lbs)] : no recent weight loss [Discharge From The Ears] : no discharge from the ears [Sore Throat] : no sore throat [Shortness Of Breath] : no shortness of breath [Chest Pain] : no chest pain [Lower Ext Edema] : no extremity edema [Palpitations] : no palpitations [Cough] : no cough [Nausea] : no nausea [Heartburn] : no heartburn [Change in Appetite] : no change in appetite [Dysphagia] : no dysphagia [Urinary Frequency] : no change in urinary frequency [Impotence] : no impotence [Joint Pain] : no joint pain [Joint Swelling] : no joint swelling [Skin: A Rash] : no rash: [Skin Lesions] : no skin lesions [Dizziness] : no dizziness [Convulsions] : no convulsions [Anxiety] : no anxiety [Easy Bleeding] : no tendency for easy bleeding [Easy Bruising] : no tendency for easy bruising

## 2019-11-15 ENCOUNTER — APPOINTMENT (OUTPATIENT)
Dept: INTERNAL MEDICINE | Facility: CLINIC | Age: 64
End: 2019-11-15
Payer: COMMERCIAL

## 2019-11-15 VITALS
DIASTOLIC BLOOD PRESSURE: 80 MMHG | HEIGHT: 72 IN | SYSTOLIC BLOOD PRESSURE: 125 MMHG | WEIGHT: 234 LBS | HEART RATE: 100 BPM | BODY MASS INDEX: 31.69 KG/M2 | RESPIRATION RATE: 13 BRPM

## 2019-11-15 PROCEDURE — 90686 IIV4 VACC NO PRSV 0.5 ML IM: CPT

## 2019-11-15 PROCEDURE — 36415 COLL VENOUS BLD VENIPUNCTURE: CPT

## 2019-11-15 PROCEDURE — G0008: CPT

## 2019-11-15 PROCEDURE — G0442 ANNUAL ALCOHOL SCREEN 15 MIN: CPT | Mod: NC

## 2019-11-15 PROCEDURE — 99396 PREV VISIT EST AGE 40-64: CPT | Mod: 25

## 2019-11-15 PROCEDURE — G0444 DEPRESSION SCREEN ANNUAL: CPT | Mod: NC,59

## 2019-11-15 NOTE — COUNSELING
[de-identified] : Continue diet and exercise [None] : None [Good understanding] : Patient has a good understanding of lifestyle changes and steps needed to achieve self management goal

## 2019-11-15 NOTE — ASSESSMENT
[FreeTextEntry1] : 64-year-old male with hypertension having been a little sore and but still increase with recent addition of chlorthalidone with good blood pressure today.\par \par Hyperlipidemia with patient wanted to avoid medication therefore continue diet and exercise.\par \par Patient again encouraged to continue his diet with regular exercise and weight loss.\par \par Colonoscopy April 2017 with followup in 5 years\par \par Followup with urology as scheduled\par \par influenza vaccine given left deltoid\par \par Venipuncture done today in the office\par \par Followup in 2 months with recheck blood pressure, blood work including lipids\par \par

## 2019-11-15 NOTE — HEALTH RISK ASSESSMENT
[Good] : ~his/her~ current health as good [Yes] : Yes [No] : In the past 12 months have you used drugs other than those required for medical reasons? No [No falls in past year] : Patient reported no falls in the past year [0] : 2) Feeling down, depressed, or hopeless: Not at all (0) [With Significant Other] : lives with significant other [None] : None [Employed] : employed [College] : College [] :  [# Of Children ___] : has [unfilled] children [Fully functional (using the telephone, shopping, preparing meals, housekeeping, doing laundry, using] : Fully functional and needs no help or supervision to perform IADLs (using the telephone, shopping, preparing meals, housekeeping, doing laundry, using transportation, managing medications and managing finances) [Fully functional (bathing, dressing, toileting, transferring, walking, feeding)] : Fully functional (bathing, dressing, toileting, transferring, walking, feeding) [Smoke Detector] : smoke detector [Carbon Monoxide Detector] : carbon monoxide detector [Seat Belt] :  uses seat belt [Sunscreen] : uses sunscreen [Very Good] : ~his/her~  mood as very good [1 or 2 (0 pts)] : 1 or 2 (0 points) [2 - 3 times a week (3 pts)] : 2 - 3  times a week (3 points) [Never (0 pts)] : Never (0 points) [Reviewed no changes] : Reviewed no changes [Name: ___] : Health Care Proxy's Name: [unfilled]  [Designated Healthcare Proxy] : Designated healthcare proxy [] : No [de-identified] : treadmill [Audit-CScore] : 3 [[id number]] : 0 [de-identified] : improved [Change in mental status noted] : No change in mental status noted [Reports changes in hearing] : Reports no changes in hearing [Reports changes in vision] : Reports no changes in vision [Reports changes in dental health] : Reports no changes in dental health [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

## 2019-11-15 NOTE — PHYSICAL EXAM
[General Appearance - In No Acute Distress] : in no acute distress [General Appearance - Alert] : alert [Sclera] : the sclera and conjunctiva were normal [PERRL With Normal Accommodation] : pupils were equal in size, round, and reactive to light [Extraocular Movements] : extraocular movements were intact [Outer Ear] : the ears and nose were normal in appearance [Oropharynx] : the oropharynx was normal [Neck Appearance] : the appearance of the neck was normal [Neck Cervical Mass (___cm)] : no neck mass was observed [Jugular Venous Distention Increased] : there was no jugular-venous distention [Thyroid Diffuse Enlargement] : the thyroid was not enlarged [Thyroid Nodule] : there were no palpable thyroid nodules [Auscultation Breath Sounds / Voice Sounds] : lungs were clear to auscultation bilaterally [Heart Rate And Rhythm] : heart rate was normal and rhythm regular [Heart Sounds] : normal S1 and S2 [Heart Sounds Gallop] : no gallops [Murmurs] : no murmurs [Heart Sounds Pericardial Friction Rub] : no pericardial rub [Arterial Pulses Carotid] : carotid pulses were normal with no bruits [Abdominal Aorta] : the abdominal aorta was normal [Arterial Pulses Femoral] : femoral pulses were normal without bruits [Full Pulse] : the pedal pulses are present [Edema] : there was no peripheral edema [Veins - Varicosity Changes] : there were no varicosital changes [Bowel Sounds] : normal bowel sounds [Abdomen Soft] : soft [Abdomen Tenderness] : non-tender [Abdomen Mass (___ Cm)] : no abdominal mass palpated [Cervical Lymph Nodes Enlarged Anterior Bilaterally] : anterior cervical [Cervical Lymph Nodes Enlarged Posterior Bilaterally] : posterior cervical [Supraclavicular Lymph Nodes Enlarged Bilaterally] : supraclavicular [Axillary Lymph Nodes Enlarged Bilaterally] : axillary [Femoral Lymph Nodes Enlarged Bilaterally] : femoral [Inguinal Lymph Nodes Enlarged Bilaterally] : inguinal [No Spinal Tenderness] : no spinal tenderness [Abnormal Walk] : normal gait [Nail Clubbing] : no clubbing  or cyanosis of the fingernails [Musculoskeletal - Swelling] : no joint swelling seen [Motor Tone] : muscle strength and tone were normal [Skin Color & Pigmentation] : normal skin color and pigmentation [Skin Turgor] : normal skin turgor [] : no rash [Cranial Nerves] : cranial nerves 2-12 were intact [No Focal Deficits] : no focal deficits [Oriented To Time, Place, And Person] : oriented to person, place, and time [Impaired Insight] : insight and judgment were intact [Affect] : the affect was normal [Over the Past 2 Weeks, Have You Felt Down, Depressed, or Hopeless?] : 1.) Over the past 2 weeks, have you felt down, depressed, or hopeless? No [FreeTextEntry1] : via  [Over the Past 2 Weeks, Have You Felt Little Interest or Pleasure Doing Things?] : 2.) Over the past 2 weeks, have you felt little interest or pleasure doing things? No

## 2019-11-15 NOTE — HISTORY OF PRESENT ILLNESS
[FreeTextEntry1] : 64-year-old black male with good general medical health in for his overdue yearly physical.\par \par The patient has history of hypertension and has been on losartan for about one year. He recently had a cardiac evaluation with echocardiogram with no significant issues and stress test negative other than elevated blood pressure. Chlorthalidone was added about 2 weeks ago.\par \par It was also recommended by cardiology that the patient start Lipitor 10 mg daily which he has not started and wants to try to lower his cholesterol with lifestyle changes.\par \par He has made some lifestyle changes with diet and exercise and has lost 10 pounds over the last month.\par \par Patient is generally feeling well without complaints including no chest pain, palpitations, shortness of breath or edema.\par \par He follows with urology secondary to erectile dysfunction and increased PSA and is on Cialis.\par \par Significant DJD of his knees for which she has received injections with minimal help and likely will need knee replacements.\par \par The patient also has had significant issues with his eyes with having had surgery for glaucoma in the past and more recently had cataract surgerywhere things are now stable.\par

## 2019-11-16 LAB
ALBUMIN SERPL ELPH-MCNC: 4.8 G/DL
ALP BLD-CCNC: 72 U/L
ALT SERPL-CCNC: 14 U/L
ANION GAP SERPL CALC-SCNC: 15 MMOL/L
APPEARANCE: CLEAR
AST SERPL-CCNC: 28 U/L
BACTERIA: NEGATIVE
BASOPHILS # BLD AUTO: 0.01 K/UL
BASOPHILS NFR BLD AUTO: 0.2 %
BILIRUB SERPL-MCNC: 1 MG/DL
BILIRUBIN URINE: NEGATIVE
BLOOD URINE: NEGATIVE
BUN SERPL-MCNC: 17 MG/DL
CALCIUM SERPL-MCNC: 9.3 MG/DL
CHLORIDE SERPL-SCNC: 96 MMOL/L
CHOLEST SERPL-MCNC: 187 MG/DL
CHOLEST/HDLC SERPL: 4.1 RATIO
CO2 SERPL-SCNC: 26 MMOL/L
COLOR: YELLOW
CREAT SERPL-MCNC: 1.2 MG/DL
EOSINOPHIL # BLD AUTO: 0.12 K/UL
EOSINOPHIL NFR BLD AUTO: 2.5 %
ESTIMATED AVERAGE GLUCOSE: 94 MG/DL
GLUCOSE QUALITATIVE U: NEGATIVE
GLUCOSE SERPL-MCNC: 129 MG/DL
HBA1C MFR BLD HPLC: 4.9 %
HCT VFR BLD CALC: 45 %
HDLC SERPL-MCNC: 46 MG/DL
HGB BLD-MCNC: 15.2 G/DL
HYALINE CASTS: 0 /LPF
IMM GRANULOCYTES NFR BLD AUTO: 0.2 %
KETONES URINE: NEGATIVE
LDLC SERPL CALC-MCNC: 116 MG/DL
LEUKOCYTE ESTERASE URINE: NEGATIVE
LYMPHOCYTES # BLD AUTO: 1.42 K/UL
LYMPHOCYTES NFR BLD AUTO: 29.3 %
MAGNESIUM SERPL-MCNC: 2 MG/DL
MAN DIFF?: NORMAL
MCHC RBC-ENTMCNC: 30.4 PG
MCHC RBC-ENTMCNC: 33.8 GM/DL
MCV RBC AUTO: 90 FL
MICROSCOPIC-UA: NORMAL
MONOCYTES # BLD AUTO: 0.67 K/UL
MONOCYTES NFR BLD AUTO: 13.8 %
NEUTROPHILS # BLD AUTO: 2.62 K/UL
NEUTROPHILS NFR BLD AUTO: 54 %
NITRITE URINE: NEGATIVE
PH URINE: 8
PLATELET # BLD AUTO: 223 K/UL
POTASSIUM SERPL-SCNC: 3.5 MMOL/L
PROT SERPL-MCNC: 7.5 G/DL
PROTEIN URINE: NEGATIVE
RBC # BLD: 5 M/UL
RBC # FLD: 12 %
RED BLOOD CELLS URINE: 1 /HPF
SODIUM SERPL-SCNC: 137 MMOL/L
SPECIFIC GRAVITY URINE: 1.01
SQUAMOUS EPITHELIAL CELLS: 0 /HPF
TRIGL SERPL-MCNC: 125 MG/DL
UROBILINOGEN URINE: NORMAL
WBC # FLD AUTO: 4.85 K/UL
WHITE BLOOD CELLS URINE: 0 /HPF

## 2019-11-18 ENCOUNTER — RESULT REVIEW (OUTPATIENT)
Age: 64
End: 2019-11-18

## 2019-12-09 ENCOUNTER — OTHER (OUTPATIENT)
Age: 64
End: 2019-12-09

## 2019-12-27 ENCOUNTER — MEDICATION RENEWAL (OUTPATIENT)
Age: 64
End: 2019-12-27

## 2020-01-24 ENCOUNTER — APPOINTMENT (OUTPATIENT)
Dept: DERMATOLOGY | Facility: CLINIC | Age: 65
End: 2020-01-24
Payer: COMMERCIAL

## 2020-01-24 PROCEDURE — 99213 OFFICE O/P EST LOW 20 MIN: CPT

## 2020-02-17 ENCOUNTER — APPOINTMENT (OUTPATIENT)
Dept: INTERNAL MEDICINE | Facility: CLINIC | Age: 65
End: 2020-02-17
Payer: COMMERCIAL

## 2020-02-17 ENCOUNTER — RESULT CHARGE (OUTPATIENT)
Age: 65
End: 2020-02-17

## 2020-02-17 VITALS — HEART RATE: 88 BPM | TEMPERATURE: 99.2 F | SYSTOLIC BLOOD PRESSURE: 125 MMHG | DIASTOLIC BLOOD PRESSURE: 80 MMHG

## 2020-02-17 LAB
FLUAV SPEC QL CULT: NORMAL
FLUBV AG SPEC QL IA: NORMAL
S PYO AG SPEC QL IA: NORMAL

## 2020-02-17 PROCEDURE — 87880 STREP A ASSAY W/OPTIC: CPT | Mod: QW

## 2020-02-17 PROCEDURE — 99213 OFFICE O/P EST LOW 20 MIN: CPT | Mod: 25

## 2020-02-17 PROCEDURE — 87804 INFLUENZA ASSAY W/OPTIC: CPT | Mod: 59,QW

## 2020-02-17 NOTE — ASSESSMENT
[FreeTextEntry1] : No signs of bacterial infection on today's exam, etiology appears viral. Patient is traveling to [place] next week therefore Ceftin 250 mg one b.i.d. #14 given and he will use only if needed .Patient advised to rest/increase fluids and use supportive therapy. Patient will call if symptoms persist or worsen and return to the office as scheduled for regular followup.\par \par \par Dr. Dueñas was present in office building while I examined patient\par

## 2020-02-17 NOTE — PHYSICAL EXAM
[No Acute Distress] : no acute distress [Normal Outer Ear/Nose] : the outer ears and nose were normal in appearance [Normal TMs] : both tympanic membranes were normal [Normal Oropharynx] : the oropharynx was normal [No Respiratory Distress] : no respiratory distress  [No Lymphadenopathy] : no lymphadenopathy [Normal Nasal Mucosa] : the nasal mucosa was normal [Regular Rhythm] : with a regular rhythm [Clear to Auscultation] : lungs were clear to auscultation bilaterally [Normal Rate] : normal rate  [Normal Affect] : the affect was normal [Normal Mood] : the mood was normal

## 2020-02-17 NOTE — HISTORY OF PRESENT ILLNESS
[Spouse] : spouse [FreeTextEntry8] : Patient complaining of not feeling well for 1-2 days. Patient is complaining of sore throat/headache/nasal congestion/cough without fever. Patient has not had sick contacts. Patient tried OTC medication with mild benefit. Patient traveled to [place] but nowhere out of the country. Patient is concerned about flu.

## 2020-03-16 ENCOUNTER — APPOINTMENT (OUTPATIENT)
Dept: INTERNAL MEDICINE | Facility: CLINIC | Age: 65
End: 2020-03-16

## 2020-05-22 ENCOUNTER — TRANSCRIPTION ENCOUNTER (OUTPATIENT)
Age: 65
End: 2020-05-22

## 2020-06-02 ENCOUNTER — APPOINTMENT (OUTPATIENT)
Dept: INTERNAL MEDICINE | Facility: CLINIC | Age: 65
End: 2020-06-02
Payer: COMMERCIAL

## 2020-06-02 PROCEDURE — 90471 IMMUNIZATION ADMIN: CPT

## 2020-06-02 PROCEDURE — 90750 HZV VACC RECOMBINANT IM: CPT

## 2020-07-14 ENCOUNTER — APPOINTMENT (OUTPATIENT)
Dept: ORTHOPEDIC SURGERY | Facility: CLINIC | Age: 65
End: 2020-07-14
Payer: COMMERCIAL

## 2020-07-14 VITALS
DIASTOLIC BLOOD PRESSURE: 77 MMHG | HEART RATE: 88 BPM | WEIGHT: 218 LBS | HEIGHT: 72 IN | SYSTOLIC BLOOD PRESSURE: 143 MMHG | BODY MASS INDEX: 29.53 KG/M2

## 2020-07-14 PROCEDURE — 20610 DRAIN/INJ JOINT/BURSA W/O US: CPT | Mod: 50

## 2020-07-14 NOTE — REASON FOR VISIT
[Follow-Up Visit] : a follow-up visit for [FreeTextEntry2] : BIlateral knee Euflexxa injection #1 Lot: [id number] Exp: 3/21/2021 & 3/28/2021

## 2020-07-14 NOTE — PROCEDURE
[de-identified] : patient received b/l knee Euflexxa injection.\par \par Knee injection viscosupplementation: I discussed at length with the patient the planned steroid and lidocaine injection for primary osteoarthritis. The risks, benefits, convalescence and alternatives were reviewed and pt concented for injection. The possible side effects discussed included but were not limited to: pain, swelling, heat and redness. There symptoms are generally mild but if they are extensive then contact the office. Giving pain relievers by mouth such as NSAID's or Tylenol can generally treat the reactions to injection. Rare cases of infection have been noted. Rash, hives and itching may occur post injection. If you have muscle pain or cramps, flushing and or swelling of the face, rapid heart beat, nausea, dizziness, fever, chills, headache, difficulty breathing, swelling in the arms or legs, or have a prickly feeling of your skin, contact a health care provider immediately. Following this discussion, the knee was prepped with alcohol and under sterile condition the injection was performed through a superolateral injection site with a 20 gauge needle. The needle was introduced into the joint, aspiration was performed to ensure intra-articular placement and the medication was injected. Upon withdrawal of the needle the site was cleaned with alcohol and a band aid applied. The patient tolerated the injection well and there were no adverse effects. Post injection instructions included no strenuous activity for 24 hours, cryotherapy and if there are any adverse effects to contact the office. \par

## 2020-07-14 NOTE — PHYSICAL EXAM
[de-identified] : General: well-appearing, not in acute distress and not obese. \par Gait: not antalgic. \par GENERAL APPEARANCE: Well nourished and hydrated, pleasant, alert, and oriented x 3. Appears their stated age. \par HEENT: Normocephalic, extraocular eye motion intact. Nasal septum midline. Oral cavity clear. External auditory canal clear. \par CARDIOVASCULAR: No apparent abnormalities. No lower leg edema. No varicosities. Pedal pulses are palpable.\par NEUROLOGIC: Sensation is normal, no muscle weakness in the upper or lower extremities.\par DERMATOLOGIC: No apparent skin lesions, moist, warm, no rash.\par SPINE: Cervical spine appears normal and moves freely; thoracic spine appears normal and moves freely; lumbosacral spine appears normal and moves freely, normal, nontender.\par MUSCULOSKELETAL: Hands, wrists, and elbows are normal and move freely, shoulders are normal and move freely. \par \par Examination b/l knee demonstrate medial and lateral joint line tenderness. He has smooth full range of motion with mild crepitus. \par 5/5 motor strength in bilateral lower extremities. Sensory: Intact in bilateral lower extremities. DTRs: Biceps, brachioradialis, triceps, patellar, ankle and plantar 2+ and symmetric bilaterally. Pulses: dorsalis pedis, posterior tibial, femoral, popliteal, and radial 2+ and symmetric bilaterally. \par  \par

## 2020-07-14 NOTE — DISCUSSION/SUMMARY
[de-identified] : Medication risks reviewed. this is a 65-year-old healthy male with endstage osteoarthritis of b/l knee. \par pt received 1st Euflexxa injection in both knees today. pt has few courses in the past and it has been helpful.\par \par We talked about knee replacement and he thinks he needs to do it next year.\par Patient is unable to take time off from his work at this time.\par he will return in 1 week for 2nd injection\par \par The patient is a 65 year old individual with end stage arthritis of their b/l knee joints. Based upon the patient's continued symptoms and failure to respond to conservative treatment I have recommended a  knee replacement for this patient. A long discussion took place with the patient describing what a total joint replacement is and what the procedure would entail. A total knee model, similar to the implant that will be used during the operation, was utilized to demonstrate and to discuss the various bearing surfaces of the implants. The hospitalization and post-operative care and rehabilitation were also discussed. The use of perioperative antibiotics and DVT prophylaxis were discussed. The risk, benefits and alternatives to a surgical intervention were discussed at length with the patient. The patient was also advised of risks related to the medical comorbidities and elevated body mass index (BMI).  A lengthy discussion took place to review the most common complications including but not limited to: deep vein thrombosis, pulmonary embolus, heart attack, stroke, infection, wound breakdown, numbness, damage to nerves, tendon, muscles, arteries or other blood vessels, death and other possible complications from anesthesia. The patient was told that we will take steps to minimize these risks by using sterile technique, antibiotics and DVT prophylaxis when appropriate and follow the patient postoperatively in the office setting to monitor progress. The possibility of recurrent pain, no improvement in pain and actual worsening of pain were also discussed with the patient.\par The discharge plan of care focused on the patient going home following surgery.  The patient was encouraged to make the necessary arrangements to have someone stay with them when they are discharged home.  Following discharge, a home care nurse will visit the patient.  The home care nurse will open your home care case and request home physical therapy services.  Home physical therapy will commence following discharge provided it is appropriate and covered by the health insurance benefit plan. \par The benefits of surgery were discussed with the patient including the potential for improving his/her current clinical condition through operative intervention. Alternatives to surgical intervention including continued conservative management were also discussed in detail. All questions were answered to the satisfaction of the patient. The treatment plan of care, as well as a model of a total knee equivalent to the one that will be used for their total joint replacement, was shared with the patient.  The patient agreed to the plan of care as well as the use of implants in their total joint replacement.\par

## 2020-07-14 NOTE — HISTORY OF PRESENT ILLNESS
[Pain Location] : pain [___ yrs] : [unfilled] year(s) ago [Stable] : stable [5] : a current pain level of 5/10 [3] : a minimum pain level of 3/10 [7] : a maximum pain level of 7/10 [Walking] : worsened by walking [Exercise Regimen] : relieved by exercise regimen [NSAIDs] : relieved by nonsteroidal anti-inflammatory drugs [Ice] : relieved by ice [Rest] : relieved by rest [de-identified] : Patient returns office to receive 1st Euflexxa injection for b/l knee pain.\par  The pain is mostly in the medial compartment of the knee.\par  In general he has more pain in the left and right.\par \par Patient has end-stage right knee osteoarthritis, fortunately his symptoms has been well managed with Euflexxa injection.\par he uses topical NSAIDS and knee sleeve which help.\par

## 2020-07-21 ENCOUNTER — APPOINTMENT (OUTPATIENT)
Dept: ORTHOPEDIC SURGERY | Facility: CLINIC | Age: 65
End: 2020-07-21
Payer: COMMERCIAL

## 2020-07-21 VITALS
HEART RATE: 84 BPM | DIASTOLIC BLOOD PRESSURE: 80 MMHG | BODY MASS INDEX: 29.53 KG/M2 | WEIGHT: 218 LBS | HEIGHT: 72 IN | SYSTOLIC BLOOD PRESSURE: 140 MMHG

## 2020-07-21 PROCEDURE — 20610 DRAIN/INJ JOINT/BURSA W/O US: CPT | Mod: 50

## 2020-07-21 NOTE — PROCEDURE
[de-identified] : patient received b/l knee Euflexxa injection.\par \par Knee injection viscosupplementation: I discussed at length with the patient the planned steroid and lidocaine injection for primary osteoarthritis. The risks, benefits, convalescence and alternatives were reviewed and pt concented for injection. The possible side effects discussed included but were not limited to: pain, swelling, heat and redness. There symptoms are generally mild but if they are extensive then contact the office. Giving pain relievers by mouth such as NSAID's or Tylenol can generally treat the reactions to injection. Rare cases of infection have been noted. Rash, hives and itching may occur post injection. If you have muscle pain or cramps, flushing and or swelling of the face, rapid heart beat, nausea, dizziness, fever, chills, headache, difficulty breathing, swelling in the arms or legs, or have a prickly feeling of your skin, contact a health care provider immediately. Following this discussion, the knee was prepped with alcohol and under sterile condition the injection was performed through a superolateral injection site with a 20 gauge needle. The needle was introduced into the joint, aspiration was performed to ensure intra-articular placement and the medication was injected. Upon withdrawal of the needle the site was cleaned with alcohol and a band aid applied. The patient tolerated the injection well and there were no adverse effects. Post injection instructions included no strenuous activity for 24 hours, cryotherapy and if there are any adverse effects to contact the office. \par

## 2020-07-21 NOTE — DISCUSSION/SUMMARY
[de-identified] : Medication risks reviewed. this is a 65-year-old healthy male with endstage osteoarthritis of b/l knee. \par pt received 2nd Euflexxa injection in both knees today. pt has few courses in the past and it has been helpful.\par \par We talked about knee replacement and he thinks he needs to do it next year.\par Patient is unable to take time off from his work at this time.\par he will return in 1 week for 3rd injection\par \par The patient is a 65 year old individual with end stage arthritis of their b/l knee joints. Based upon the patient's continued symptoms and failure to respond to conservative treatment I have recommended a  knee replacement for this patient. A long discussion took place with the patient describing what a total joint replacement is and what the procedure would entail. A total knee model, similar to the implant that will be used during the operation, was utilized to demonstrate and to discuss the various bearing surfaces of the implants. The hospitalization and post-operative care and rehabilitation were also discussed. The use of perioperative antibiotics and DVT prophylaxis were discussed. The risk, benefits and alternatives to a surgical intervention were discussed at length with the patient. The patient was also advised of risks related to the medical comorbidities and elevated body mass index (BMI).  A lengthy discussion took place to review the most common complications including but not limited to: deep vein thrombosis, pulmonary embolus, heart attack, stroke, infection, wound breakdown, numbness, damage to nerves, tendon, muscles, arteries or other blood vessels, death and other possible complications from anesthesia. The patient was told that we will take steps to minimize these risks by using sterile technique, antibiotics and DVT prophylaxis when appropriate and follow the patient postoperatively in the office setting to monitor progress. The possibility of recurrent pain, no improvement in pain and actual worsening of pain were also discussed with the patient.\par The discharge plan of care focused on the patient going home following surgery.  The patient was encouraged to make the necessary arrangements to have someone stay with them when they are discharged home.  Following discharge, a home care nurse will visit the patient.  The home care nurse will open your home care case and request home physical therapy services.  Home physical therapy will commence following discharge provided it is appropriate and covered by the health insurance benefit plan. \par The benefits of surgery were discussed with the patient including the potential for improving his/her current clinical condition through operative intervention. Alternatives to surgical intervention including continued conservative management were also discussed in detail. All questions were answered to the satisfaction of the patient. The treatment plan of care, as well as a model of a total knee equivalent to the one that will be used for their total joint replacement, was shared with the patient.  The patient agreed to the plan of care as well as the use of implants in their total joint replacement.\par

## 2020-07-21 NOTE — REASON FOR VISIT
[Follow-Up Visit] : a follow-up visit for [Other: ____] : [unfilled] [FreeTextEntry2] : BIlateral knee Euflexxa injection #2 Lot: [id number] Exp: 3/21/2021 & 3/28/2021.

## 2020-07-22 ENCOUNTER — APPOINTMENT (OUTPATIENT)
Dept: ORTHOPEDIC SURGERY | Facility: CLINIC | Age: 65
End: 2020-07-22

## 2020-07-28 ENCOUNTER — APPOINTMENT (OUTPATIENT)
Dept: ORTHOPEDIC SURGERY | Facility: CLINIC | Age: 65
End: 2020-07-28
Payer: COMMERCIAL

## 2020-07-28 VITALS
HEART RATE: 92 BPM | HEIGHT: 73 IN | BODY MASS INDEX: 29.16 KG/M2 | SYSTOLIC BLOOD PRESSURE: 130 MMHG | DIASTOLIC BLOOD PRESSURE: 80 MMHG | WEIGHT: 220 LBS

## 2020-07-28 DIAGNOSIS — M17.12 UNILATERAL PRIMARY OSTEOARTHRITIS, LEFT KNEE: ICD-10-CM

## 2020-07-28 PROCEDURE — 20610 DRAIN/INJ JOINT/BURSA W/O US: CPT | Mod: 50

## 2020-07-28 NOTE — REASON FOR VISIT
[Follow-Up Visit] : a follow-up visit for [FreeTextEntry2] : bilateral knee pain. Bilateral knee Euflexxa injection #3 Lot: [id number] Exp: 3/28/2021. \par

## 2020-07-28 NOTE — DISCUSSION/SUMMARY
[de-identified] : Medication risks reviewed. this is a 65-year-old healthy male with endstage osteoarthritis of b/l knee. \par pt received 3rd Euflexxa injection in both knees today. pt has few courses in the past and it has been helpful.\par \par We talked about knee replacement and he thinks he needs to do it next year.\par Patient is unable to take time off from his work at this time.\par he will return for possible cortisone if he has flare up\par \par The patient is a 65 year old individual with end stage arthritis of their b/l knee joints. Based upon the patient's continued symptoms and failure to respond to conservative treatment I have recommended a knee replacement for this patient. A long discussion took place with the patient describing what a total joint replacement is and what the procedure would entail. A total knee model, similar to the implant that will be used during the operation, was utilized to demonstrate and to discuss the various bearing surfaces of the implants. The hospitalization and post-operative care and rehabilitation were also discussed. The use of perioperative antibiotics and DVT prophylaxis were discussed. The risk, benefits and alternatives to a surgical intervention were discussed at length with the patient. The patient was also advised of risks related to the medical comorbidities and elevated body mass index (BMI).  A lengthy discussion took place to review the most common complications including but not limited to: deep vein thrombosis, pulmonary embolus, heart attack, stroke, infection, wound breakdown, numbness, damage to nerves, tendon, muscles, arteries or other blood vessels, death and other possible complications from anesthesia. The patient was told that we will take steps to minimize these risks by using sterile technique, antibiotics and DVT prophylaxis when appropriate and follow the patient postoperatively in the office setting to monitor progress. The possibility of recurrent pain, no improvement in pain and actual worsening of pain were also discussed with the patient.\par The discharge plan of care focused on the patient going home following surgery.  The patient was encouraged to make the necessary arrangements to have someone stay with them when they are discharged home.  Following discharge, a home care nurse will visit the patient.  The home care nurse will open your home care case and request home physical therapy services.  Home physical therapy will commence following discharge provided it is appropriate and covered by the health insurance benefit plan. \par The benefits of surgery were discussed with the patient including the potential for improving his/her current clinical condition through operative intervention. Alternatives to surgical intervention including continued conservative management were also discussed in detail. All questions were answered to the satisfaction of the patient. The treatment plan of care, as well as a model of a total knee equivalent to the one that will be used for their total joint replacement, was shared with the patient.  The patient agreed to the plan of care as well as the use of implants in their total joint replacement.\par

## 2020-07-28 NOTE — PROCEDURE
[de-identified] : patient received b/l knee Euflexxa injection.\par \par Knee injection viscosupplementation: I discussed at length with the patient the planned steroid and lidocaine injection for primary osteoarthritis. The risks, benefits, convalescence and alternatives were reviewed and pt concented for injection. The possible side effects discussed included but were not limited to: pain, swelling, heat and redness. There symptoms are generally mild but if they are extensive then contact the office. Giving pain relievers by mouth such as NSAID's or Tylenol can generally treat the reactions to injection. Rare cases of infection have been noted. Rash, hives and itching may occur post injection. If you have muscle pain or cramps, flushing and or swelling of the face, rapid heart beat, nausea, dizziness, fever, chills, headache, difficulty breathing, swelling in the arms or legs, or have a prickly feeling of your skin, contact a health care provider immediately. Following this discussion, the knee was prepped with alcohol and under sterile condition the injection was performed through a superolateral injection site with a 20 gauge needle. The needle was introduced into the joint, aspiration was performed to ensure intra-articular placement and the medication was injected. Upon withdrawal of the needle the site was cleaned with alcohol and a band aid applied. The patient tolerated the injection well and there were no adverse effects. Post injection instructions included no strenuous activity for 24 hours, cryotherapy and if there are any adverse effects to contact the office. \par

## 2020-09-29 ENCOUNTER — APPOINTMENT (OUTPATIENT)
Dept: INTERNAL MEDICINE | Facility: CLINIC | Age: 65
End: 2020-09-29
Payer: COMMERCIAL

## 2020-09-29 ENCOUNTER — NON-APPOINTMENT (OUTPATIENT)
Age: 65
End: 2020-09-29

## 2020-09-29 VITALS
BODY MASS INDEX: 30.22 KG/M2 | RESPIRATION RATE: 14 BRPM | HEIGHT: 73 IN | TEMPERATURE: 97 F | SYSTOLIC BLOOD PRESSURE: 123 MMHG | WEIGHT: 228 LBS | HEART RATE: 74 BPM | DIASTOLIC BLOOD PRESSURE: 80 MMHG

## 2020-09-29 DIAGNOSIS — Z01.818 ENCOUNTER FOR OTHER PREPROCEDURAL EXAMINATION: ICD-10-CM

## 2020-09-29 DIAGNOSIS — L29.0 PRURITUS ANI: ICD-10-CM

## 2020-09-29 DIAGNOSIS — Z87.39 PERSONAL HISTORY OF OTHER DISEASES OF THE MUSCULOSKELETAL SYSTEM AND CONNECTIVE TISSUE: ICD-10-CM

## 2020-09-29 DIAGNOSIS — S16.1XXA STRAIN OF MUSCLE, FASCIA AND TENDON AT NECK LEVEL, INITIAL ENCOUNTER: ICD-10-CM

## 2020-09-29 DIAGNOSIS — J06.9 ACUTE UPPER RESPIRATORY INFECTION, UNSPECIFIED: ICD-10-CM

## 2020-09-29 DIAGNOSIS — M54.5 LOW BACK PAIN: ICD-10-CM

## 2020-09-29 PROCEDURE — 93000 ELECTROCARDIOGRAM COMPLETE: CPT | Mod: 59

## 2020-09-29 PROCEDURE — 90686 IIV4 VACC NO PRSV 0.5 ML IM: CPT

## 2020-09-29 PROCEDURE — G0008: CPT

## 2020-09-29 PROCEDURE — G0444 DEPRESSION SCREEN ANNUAL: CPT | Mod: NC,59

## 2020-09-29 PROCEDURE — G0442 ANNUAL ALCOHOL SCREEN 15 MIN: CPT | Mod: NC,59

## 2020-09-29 PROCEDURE — 90472 IMMUNIZATION ADMIN EACH ADD: CPT

## 2020-09-29 PROCEDURE — 99396 PREV VISIT EST AGE 40-64: CPT | Mod: 25

## 2020-09-29 PROCEDURE — 90750 HZV VACC RECOMBINANT IM: CPT

## 2020-09-29 RX ORDER — CEFUROXIME AXETIL 250 MG/1
250 TABLET ORAL
Qty: 14 | Refills: 0 | Status: DISCONTINUED | COMMUNITY
Start: 2020-02-17 | End: 2020-09-29

## 2020-10-20 DIAGNOSIS — R31.9 HEMATURIA, UNSPECIFIED: ICD-10-CM

## 2020-10-20 LAB
ALBUMIN SERPL ELPH-MCNC: 4.5 G/DL
ALP BLD-CCNC: 67 U/L
ALT SERPL-CCNC: 15 U/L
ANION GAP SERPL CALC-SCNC: 14 MMOL/L
APPEARANCE: CLEAR
AST SERPL-CCNC: 27 U/L
BACTERIA: NEGATIVE
BASOPHILS # BLD AUTO: 0.02 K/UL
BASOPHILS NFR BLD AUTO: 0.5 %
BILIRUB SERPL-MCNC: 1.3 MG/DL
BILIRUBIN URINE: NEGATIVE
BLOOD URINE: NEGATIVE
BUN SERPL-MCNC: 12 MG/DL
CALCIUM SERPL-MCNC: 9.1 MG/DL
CHLORIDE SERPL-SCNC: 98 MMOL/L
CHOLEST SERPL-MCNC: 191 MG/DL
CHOLEST/HDLC SERPL: 3.9 RATIO
CO2 SERPL-SCNC: 28 MMOL/L
COLOR: YELLOW
CREAT SERPL-MCNC: 1.17 MG/DL
EOSINOPHIL # BLD AUTO: 0.16 K/UL
EOSINOPHIL NFR BLD AUTO: 3.9 %
ESTIMATED AVERAGE GLUCOSE: 94 MG/DL
GLUCOSE QUALITATIVE U: NEGATIVE
GLUCOSE SERPL-MCNC: 122 MG/DL
HBA1C MFR BLD HPLC: 4.9 %
HCT VFR BLD CALC: 44 %
HDLC SERPL-MCNC: 49 MG/DL
HGB BLD-MCNC: 14.7 G/DL
HYALINE CASTS: 0 /LPF
IMM GRANULOCYTES NFR BLD AUTO: 0.2 %
KETONES URINE: NEGATIVE
LDLC SERPL CALC-MCNC: 117 MG/DL
LEUKOCYTE ESTERASE URINE: NEGATIVE
LYMPHOCYTES # BLD AUTO: 1.63 K/UL
LYMPHOCYTES NFR BLD AUTO: 40.1 %
MAN DIFF?: NORMAL
MCHC RBC-ENTMCNC: 30.6 PG
MCHC RBC-ENTMCNC: 33.4 GM/DL
MCV RBC AUTO: 91.7 FL
MICROSCOPIC-UA: NORMAL
MONOCYTES # BLD AUTO: 0.58 K/UL
MONOCYTES NFR BLD AUTO: 14.3 %
NEUTROPHILS # BLD AUTO: 1.66 K/UL
NEUTROPHILS NFR BLD AUTO: 41 %
NITRITE URINE: NEGATIVE
PH URINE: 6.5
PLATELET # BLD AUTO: 195 K/UL
POTASSIUM SERPL-SCNC: 3.1 MMOL/L
PROT SERPL-MCNC: 7.2 G/DL
PROTEIN URINE: NORMAL
RBC # BLD: 4.8 M/UL
RBC # FLD: 11.9 %
RED BLOOD CELLS URINE: 6 /HPF
SARS-COV-2 IGG SERPL IA-ACNC: <0.1 INDEX
SARS-COV-2 IGG SERPL QL IA: NEGATIVE
SODIUM SERPL-SCNC: 140 MMOL/L
SPECIFIC GRAVITY URINE: 1.02
SQUAMOUS EPITHELIAL CELLS: 1 /HPF
TESTOST SERPL-MCNC: 442 NG/DL
TRIGL SERPL-MCNC: 127 MG/DL
TSH SERPL-ACNC: 2.19 UIU/ML
UROBILINOGEN URINE: NORMAL
WBC # FLD AUTO: 4.06 K/UL
WHITE BLOOD CELLS URINE: 1 /HPF

## 2020-10-20 NOTE — HEALTH RISK ASSESSMENT
[Good] : ~his/her~ current health as good [Very Good] : ~his/her~  mood as very good [Yes] : Yes [2 - 3 times a week (3 pts)] : 2 - 3  times a week (3 points) [1 or 2 (0 pts)] : 1 or 2 (0 points) [Never (0 pts)] : Never (0 points) [No] : In the past 12 months have you used drugs other than those required for medical reasons? No [No falls in past year] : Patient reported no falls in the past year [0] : 2) Feeling down, depressed, or hopeless: Not at all (0) [None] : None [With Significant Other] : lives with significant other [Employed] : employed [College] : College [] :  [# Of Children ___] : has [unfilled] children [Fully functional (bathing, dressing, toileting, transferring, walking, feeding)] : Fully functional (bathing, dressing, toileting, transferring, walking, feeding) [Fully functional (using the telephone, shopping, preparing meals, housekeeping, doing laundry, using] : Fully functional and needs no help or supervision to perform IADLs (using the telephone, shopping, preparing meals, housekeeping, doing laundry, using transportation, managing medications and managing finances) [Smoke Detector] : smoke detector [Carbon Monoxide Detector] : carbon monoxide detector [Seat Belt] :  uses seat belt [Sunscreen] : uses sunscreen [Reviewed no changes] : Reviewed no changes [Designated Healthcare Proxy] : Designated healthcare proxy [Name: ___] : Health Care Proxy's Name: [unfilled]  [] : No [Audit-CScore] : 3 [de-identified] : treadmill [Change in mental status noted] : No change in mental status noted [de-identified] : improved [OOL3Tgyta] : 0 [Reports changes in vision] : Reports no changes in vision [Reports changes in dental health] : Reports no changes in dental health [Reports changes in hearing] : Reports no changes in hearing [HepatitisCComments] : Negative [HepatitisCDate] : 01/14 [de-identified] : glasses,  glaucoma s/p surgery,  catartacts s/p surgery [FreeTextEntry2] : [employer] [AdvancecareDate] : 09/20

## 2020-10-20 NOTE — COUNSELING
[None] : None [Potential consequences of obesity discussed] : Potential consequences of obesity discussed [Benefits of weight loss discussed] : Benefits of weight loss discussed [Encouraged to increase physical activity] : Encouraged to increase physical activity [Needs reinforcement, provided] : Patient needs reinforcement on understanding of lifestyle changes and steps needed to achieve self management goal; reinforcement was provided [de-identified] : Continue diet and exercise

## 2020-10-20 NOTE — ASSESSMENT
[FreeTextEntry1] : 65-year-old male currently medically stable with controlled hypertension on present combination of losartan and chlorthalidone.\par \par Hyperlipidemia on Lipitor\par \par Patient again encouraged to continue his diet with regular exercise and weight loss.\par \par Colonoscopy April 2017 with followup in 5 years\par \par Followup with urology as scheduled\par \par influenza vaccine given left deltoid\par Shingrix #2 given right deltoid\par Rx given for labs to be done at the lab\par \par Followup in 6 months\par \par

## 2020-10-20 NOTE — ADDENDUM
[FreeTextEntry1] : Blood work good other than potassium decreased at 3.1 therefore start KCl 20 MB q. daily.\par \par urinalysis normal other than slight increased RBCs.\par Increase water daily.\par \par Repeat BMP and UA in 3 weeks

## 2020-10-20 NOTE — HISTORY OF PRESENT ILLNESS
[FreeTextEntry1] : 65-year-old black male with good general medical health in for his yearly physical.\par \par The patient has history of hypertension and has been on losartan for about 2 years. He had a cardiac evaluation October 2019 with echocardiogram with no significant issues and stress test negative other than elevated blood pressure. Chlorthalidone was added with improved BP\par It was also recommended by cardiology that the patient start Lipitor 10 mg daily which he is on\par \par He had a good lifestyle changes but has not kept up with it but plans to restart changes.\par \par Patient is generally feeling well without complaints including no chest pain, palpitations, shortness of breath or edema.\par \par He follows with urology secondary to erectile dysfunction and increased PSA and is on Cialis.\par He does complain of 2-3 times nocturia\par \par Significant DJD of his knees for which she has received injections with minimal help and likely will need knee replacements.\par \par The patient also has had significant issues with his eyes with having had surgery for glaucoma in the past and more recently had cataract surgery where things are now stable.\par

## 2020-10-20 NOTE — PHYSICAL EXAM
[General Appearance - Alert] : alert [General Appearance - In No Acute Distress] : in no acute distress [Sclera] : the sclera and conjunctiva were normal [PERRL With Normal Accommodation] : pupils were equal in size, round, and reactive to light [Extraocular Movements] : extraocular movements were intact [Outer Ear] : the ears and nose were normal in appearance [Oropharynx] : the oropharynx was normal [Neck Appearance] : the appearance of the neck was normal [Neck Cervical Mass (___cm)] : no neck mass was observed [Jugular Venous Distention Increased] : there was no jugular-venous distention [Thyroid Diffuse Enlargement] : the thyroid was not enlarged [Thyroid Nodule] : there were no palpable thyroid nodules [Auscultation Breath Sounds / Voice Sounds] : lungs were clear to auscultation bilaterally [Heart Rate And Rhythm] : heart rate was normal and rhythm regular [Heart Sounds] : normal S1 and S2 [Heart Sounds Gallop] : no gallops [Murmurs] : no murmurs [Heart Sounds Pericardial Friction Rub] : no pericardial rub [Arterial Pulses Carotid] : carotid pulses were normal with no bruits [Abdominal Aorta] : the abdominal aorta was normal [Arterial Pulses Femoral] : femoral pulses were normal without bruits [Full Pulse] : the pedal pulses are present [Edema] : there was no peripheral edema [Veins - Varicosity Changes] : there were no varicosital changes [Bowel Sounds] : normal bowel sounds [Abdomen Soft] : soft [Abdomen Tenderness] : non-tender [Abdomen Mass (___ Cm)] : no abdominal mass palpated [Cervical Lymph Nodes Enlarged Posterior Bilaterally] : posterior cervical [Cervical Lymph Nodes Enlarged Anterior Bilaterally] : anterior cervical [Supraclavicular Lymph Nodes Enlarged Bilaterally] : supraclavicular [Axillary Lymph Nodes Enlarged Bilaterally] : axillary [Femoral Lymph Nodes Enlarged Bilaterally] : femoral [Inguinal Lymph Nodes Enlarged Bilaterally] : inguinal [No Spinal Tenderness] : no spinal tenderness [Abnormal Walk] : normal gait [Nail Clubbing] : no clubbing  or cyanosis of the fingernails [Musculoskeletal - Swelling] : no joint swelling seen [Motor Tone] : muscle strength and tone were normal [Skin Color & Pigmentation] : normal skin color and pigmentation [Skin Turgor] : normal skin turgor [] : no rash [Cranial Nerves] : cranial nerves 2-12 were intact [No Focal Deficits] : no focal deficits [Oriented To Time, Place, And Person] : oriented to person, place, and time [Impaired Insight] : insight and judgment were intact [Affect] : the affect was normal [Over the Past 2 Weeks, Have You Felt Down, Depressed, or Hopeless?] : 1.) Over the past 2 weeks, have you felt down, depressed, or hopeless? No [FreeTextEntry1] : via  [Over the Past 2 Weeks, Have You Felt Little Interest or Pleasure Doing Things?] : 2.) Over the past 2 weeks, have you felt little interest or pleasure doing things? No

## 2020-10-26 ENCOUNTER — NON-APPOINTMENT (OUTPATIENT)
Age: 65
End: 2020-10-26

## 2020-10-26 ENCOUNTER — APPOINTMENT (OUTPATIENT)
Dept: CARDIOLOGY | Facility: CLINIC | Age: 65
End: 2020-10-26
Payer: COMMERCIAL

## 2020-10-26 VITALS
TEMPERATURE: 97.7 F | BODY MASS INDEX: 30.48 KG/M2 | DIASTOLIC BLOOD PRESSURE: 73 MMHG | HEIGHT: 73 IN | SYSTOLIC BLOOD PRESSURE: 121 MMHG | WEIGHT: 230 LBS | HEART RATE: 92 BPM | OXYGEN SATURATION: 98 %

## 2020-10-26 PROCEDURE — 99072 ADDL SUPL MATRL&STAF TM PHE: CPT

## 2020-10-26 PROCEDURE — 99214 OFFICE O/P EST MOD 30 MIN: CPT

## 2020-10-26 PROCEDURE — 93000 ELECTROCARDIOGRAM COMPLETE: CPT

## 2020-10-26 RX ORDER — POTASSIUM CHLORIDE 1500 MG/1
20 TABLET, EXTENDED RELEASE ORAL
Qty: 30 | Refills: 0 | Status: DISCONTINUED | COMMUNITY
Start: 2020-10-20 | End: 2020-10-26

## 2020-11-01 NOTE — ASSESSMENT
[FreeTextEntry1] : Patient's blood pressure is to goal.\par Advised increasing physical activity and weight loss.\par Is a starting to take his cholesterol medication.  Repeat lipid panel in about 2 to 3 months.  May need higher dose of Lipitor.\par Denies any cardiac symptoms such as chest pain or shortness of breath.\par Follow-up with Dr. Dueñas closely.\par patient was advised to see us in 6 months if stable and certainly earlier with any new symptoms.\par Patient was advised to partake in 150 minutes of moderate exercise per week.\par

## 2020-11-01 NOTE — REVIEW OF SYSTEMS
[Recent Weight Gain (___ Lbs)] : recent [unfilled] ~Ulb weight gain [Feeling Fatigued] : not feeling fatigued [Recent Weight Loss (___ Lbs)] : no recent weight loss [Discharge From The Ears] : no discharge from the ears [Sore Throat] : no sore throat [Shortness Of Breath] : no shortness of breath [Chest Pain] : no chest pain [Lower Ext Edema] : no extremity edema [Palpitations] : no palpitations [Cough] : no cough [Nausea] : no nausea [Heartburn] : no heartburn [Change in Appetite] : no change in appetite [Dysphagia] : no dysphagia [Urinary Frequency] : no change in urinary frequency [Impotence] : no impotence [Joint Pain] : no joint pain [Joint Swelling] : no joint swelling [Skin: A Rash] : no rash: [Skin Lesions] : no skin lesions [Dizziness] : no dizziness [Convulsions] : no convulsions [Anxiety] : no anxiety [Easy Bleeding] : no tendency for easy bleeding [Easy Bruising] : no tendency for easy bruising

## 2020-11-01 NOTE — HISTORY OF PRESENT ILLNESS
[FreeTextEntry1] : Very pleasant 65-year-old male who is here for evaluation due to hypertension hyperlipidemia and being overweight.\par He feels well and denies any chest pain or shortness of breath.  Since last November he has lost weight, about 6 pounds.  In July of last year his weight was 220 pounds.  He is planning to resume exercise.\par \par EKG with normal sinus rhythm, heart rate is 94 bpm, no ST-T changes.\par Echocardiogram October 30, 2019 with normal LV systolic function, LVEF 61%, moderate LVH, no pericardial effusion.

## 2020-11-17 ENCOUNTER — TRANSCRIPTION ENCOUNTER (OUTPATIENT)
Age: 65
End: 2020-11-17

## 2020-11-18 ENCOUNTER — NON-APPOINTMENT (OUTPATIENT)
Age: 65
End: 2020-11-18

## 2020-11-20 LAB
ANION GAP SERPL CALC-SCNC: 12 MMOL/L
APPEARANCE: CLEAR
BACTERIA: NEGATIVE
BILIRUBIN URINE: NEGATIVE
BLOOD URINE: NEGATIVE
BUN SERPL-MCNC: 14 MG/DL
CALCIUM SERPL-MCNC: 9.1 MG/DL
CHLORIDE SERPL-SCNC: 101 MMOL/L
CO2 SERPL-SCNC: 26 MMOL/L
COLOR: NORMAL
CREAT SERPL-MCNC: 1.13 MG/DL
GLUCOSE QUALITATIVE U: NEGATIVE
GLUCOSE SERPL-MCNC: 117 MG/DL
HYALINE CASTS: 0 /LPF
KETONES URINE: NEGATIVE
LEUKOCYTE ESTERASE URINE: NEGATIVE
MAGNESIUM SERPL-MCNC: 1.9 MG/DL
MICROSCOPIC-UA: NORMAL
NITRITE URINE: NEGATIVE
PH URINE: 5.5
POTASSIUM SERPL-SCNC: 3.3 MMOL/L
PROTEIN URINE: NEGATIVE
RED BLOOD CELLS URINE: 1 /HPF
SODIUM SERPL-SCNC: 139 MMOL/L
SPECIFIC GRAVITY URINE: 1.01
SQUAMOUS EPITHELIAL CELLS: 1 /HPF
UROBILINOGEN URINE: NORMAL
WHITE BLOOD CELLS URINE: 1 /HPF

## 2020-12-24 ENCOUNTER — NON-APPOINTMENT (OUTPATIENT)
Age: 65
End: 2020-12-24

## 2020-12-24 LAB
ANION GAP SERPL CALC-SCNC: 12 MMOL/L
BUN SERPL-MCNC: 15 MG/DL
CALCIUM SERPL-MCNC: 9 MG/DL
CHLORIDE SERPL-SCNC: 101 MMOL/L
CO2 SERPL-SCNC: 26 MMOL/L
CREAT SERPL-MCNC: 1.12 MG/DL
GLUCOSE SERPL-MCNC: 124 MG/DL
POTASSIUM SERPL-SCNC: 3.4 MMOL/L
SODIUM SERPL-SCNC: 139 MMOL/L

## 2021-01-15 ENCOUNTER — APPOINTMENT (OUTPATIENT)
Dept: DERMATOLOGY | Facility: CLINIC | Age: 66
End: 2021-01-15
Payer: COMMERCIAL

## 2021-01-15 PROCEDURE — 99072 ADDL SUPL MATRL&STAF TM PHE: CPT

## 2021-01-15 PROCEDURE — 99213 OFFICE O/P EST LOW 20 MIN: CPT

## 2021-01-18 ENCOUNTER — TRANSCRIPTION ENCOUNTER (OUTPATIENT)
Age: 66
End: 2021-01-18

## 2021-01-21 ENCOUNTER — APPOINTMENT (OUTPATIENT)
Dept: CARDIOLOGY | Facility: CLINIC | Age: 66
End: 2021-01-21

## 2021-01-22 ENCOUNTER — EMERGENCY (EMERGENCY)
Facility: HOSPITAL | Age: 66
LOS: 1 days | Discharge: HOME | End: 2021-01-22
Payer: COMMERCIAL

## 2021-01-22 VITALS
HEART RATE: 82 BPM | SYSTOLIC BLOOD PRESSURE: 150 MMHG | RESPIRATION RATE: 18 BRPM | DIASTOLIC BLOOD PRESSURE: 83 MMHG | TEMPERATURE: 98 F | OXYGEN SATURATION: 97 % | HEIGHT: 73.5 IN

## 2021-01-22 DIAGNOSIS — Z98.890 OTHER SPECIFIED POSTPROCEDURAL STATES: Chronic | ICD-10-CM

## 2021-01-22 DIAGNOSIS — H40.11X0 PRIMARY OPEN-ANGLE GLAUCOMA, STAGE UNSPECIFIED: Chronic | ICD-10-CM

## 2021-01-22 LAB — SARS-COV-2 RNA SPEC QL NAA+PROBE: SIGNIFICANT CHANGE UP

## 2021-01-22 PROCEDURE — 99283 EMERGENCY DEPT VISIT LOW MDM: CPT

## 2021-01-22 PROCEDURE — U0003: CPT

## 2021-01-22 PROCEDURE — U0005: CPT

## 2021-01-22 NOTE — ED PROVIDER NOTE - CLINICAL SUMMARY MEDICAL DECISION MAKING FREE TEXT BOX
Pt nontoxic appearing, stable vitals, ambulatory with stable saturation without supplemental oxygen. PT does not meet criteria listed in most updated guidelines as per [facility] protocol/algorithm for admission at this time. pt advised about self-quarantine instructions until negative test results and/or symptom resolution. pt advised on hand hygiene, monitoring of symptoms, antipyretic use as well as and fu with primary care provider. Instructions given in pre-printed copy.

## 2021-01-22 NOTE — ED PROVIDER NOTE - PMH
79 Abdominal hernia without obstruction and without gangrene, recurrence not specified, unspecified hernia type    Anal fissure    Glaucoma    Hemorrhoids, unspecified hemorrhoid type    HLD (hyperlipidemia)    Other constipation    Primary osteoarthritis of both knees    Secondary cataract of left eye, unspecified secondary cataract type

## 2021-01-22 NOTE — ED PROVIDER NOTE - PATIENT PORTAL LINK FT
You can access the FollowMyHealth Patient Portal offered by [facility] by registering at the following website: [email or web address]. By joining [employer]’s FollowMyHealth portal, you will also be able to view your health information using other applications (apps) compatible with our system.

## 2021-01-23 ENCOUNTER — NON-APPOINTMENT (OUTPATIENT)
Age: 66
End: 2021-01-23

## 2021-01-24 LAB
ANION GAP SERPL CALC-SCNC: 15 MMOL/L
BUN SERPL-MCNC: 16 MG/DL
CALCIUM SERPL-MCNC: 9.4 MG/DL
CHLORIDE SERPL-SCNC: 101 MMOL/L
CO2 SERPL-SCNC: 25 MMOL/L
CREAT SERPL-MCNC: 1.17 MG/DL
GLUCOSE SERPL-MCNC: 149 MG/DL
POTASSIUM SERPL-SCNC: 3.6 MMOL/L
SODIUM SERPL-SCNC: 141 MMOL/L

## 2021-02-01 NOTE — ED PROVIDER NOTE - GASTROINTESTINAL, MLM
no rashes , no suspicious lesions , no areas of discoloration Abdomen soft, non-tender, no guarding.

## 2021-02-08 ENCOUNTER — NON-APPOINTMENT (OUTPATIENT)
Age: 66
End: 2021-02-08

## 2021-04-05 ENCOUNTER — APPOINTMENT (OUTPATIENT)
Dept: INTERNAL MEDICINE | Facility: CLINIC | Age: 66
End: 2021-04-05
Payer: COMMERCIAL

## 2021-04-05 VITALS
TEMPERATURE: 97.7 F | BODY MASS INDEX: 31.14 KG/M2 | DIASTOLIC BLOOD PRESSURE: 80 MMHG | HEIGHT: 73 IN | HEART RATE: 97 BPM | OXYGEN SATURATION: 97 % | RESPIRATION RATE: 13 BRPM | SYSTOLIC BLOOD PRESSURE: 125 MMHG | WEIGHT: 235 LBS

## 2021-04-05 DIAGNOSIS — E66.3 OVERWEIGHT: ICD-10-CM

## 2021-04-05 PROCEDURE — 99072 ADDL SUPL MATRL&STAF TM PHE: CPT

## 2021-04-05 PROCEDURE — 99213 OFFICE O/P EST LOW 20 MIN: CPT | Mod: 25

## 2021-04-05 PROCEDURE — 36415 COLL VENOUS BLD VENIPUNCTURE: CPT

## 2021-04-05 NOTE — PHYSICAL EXAM
[General Appearance - In No Acute Distress] : in no acute distress [] : no respiratory distress [Respiration, Rhythm And Depth] : normal respiratory rhythm and effort [Auscultation Breath Sounds / Voice Sounds] : lungs were clear to auscultation bilaterally [Heart Rate And Rhythm] : heart rate was normal and rhythm regular [Affect] : the affect was normal [Mood] : the mood was normal [Normal Oropharynx] : the oropharynx was normal

## 2021-04-06 LAB
ALBUMIN SERPL ELPH-MCNC: 4.5 G/DL
ALP BLD-CCNC: 77 U/L
ALT SERPL-CCNC: 13 U/L
ANION GAP SERPL CALC-SCNC: 11 MMOL/L
AST SERPL-CCNC: 25 U/L
BASOPHILS # BLD AUTO: 0.01 K/UL
BASOPHILS NFR BLD AUTO: 0.3 %
BILIRUB SERPL-MCNC: 1.2 MG/DL
BUN SERPL-MCNC: 11 MG/DL
CALCIUM SERPL-MCNC: 9.2 MG/DL
CHLORIDE SERPL-SCNC: 99 MMOL/L
CHOLEST SERPL-MCNC: 139 MG/DL
CO2 SERPL-SCNC: 31 MMOL/L
CREAT SERPL-MCNC: 1.1 MG/DL
EOSINOPHIL # BLD AUTO: 0.14 K/UL
EOSINOPHIL NFR BLD AUTO: 3.9 %
ESTIMATED AVERAGE GLUCOSE: 94 MG/DL
GLUCOSE SERPL-MCNC: 120 MG/DL
HBA1C MFR BLD HPLC: 4.9 %
HCT VFR BLD CALC: 43.4 %
HDLC SERPL-MCNC: 49 MG/DL
HGB BLD-MCNC: 14.5 G/DL
IMM GRANULOCYTES NFR BLD AUTO: 0.3 %
LDLC SERPL CALC-MCNC: 67 MG/DL
LYMPHOCYTES # BLD AUTO: 1.14 K/UL
LYMPHOCYTES NFR BLD AUTO: 32.1 %
MAN DIFF?: NORMAL
MCHC RBC-ENTMCNC: 30.7 PG
MCHC RBC-ENTMCNC: 33.4 GM/DL
MCV RBC AUTO: 91.8 FL
MONOCYTES # BLD AUTO: 0.48 K/UL
MONOCYTES NFR BLD AUTO: 13.5 %
NEUTROPHILS # BLD AUTO: 1.77 K/UL
NEUTROPHILS NFR BLD AUTO: 49.9 %
NONHDLC SERPL-MCNC: 90 MG/DL
PLATELET # BLD AUTO: 210 K/UL
POTASSIUM SERPL-SCNC: 3.3 MMOL/L
PROT SERPL-MCNC: 7.4 G/DL
RBC # BLD: 4.73 M/UL
RBC # FLD: 11.9 %
SODIUM SERPL-SCNC: 140 MMOL/L
TRIGL SERPL-MCNC: 112 MG/DL
WBC # FLD AUTO: 3.55 K/UL

## 2021-04-07 NOTE — ADDENDUM
[FreeTextEntry1] : Labs show\par -Potassium decreased at 3.3 ? compliance with potassium supplement=yes so increase to 3x QD\par -White blood cell count decreased @ 3.5, check one month

## 2021-04-07 NOTE — HISTORY OF PRESENT ILLNESS
[FreeTextEntry1] : Followup [de-identified] : Pt presents for followup on HTN/hyperlipidemia. Pt is fasting Pt is on lipitor for hyperlipidemia and on losartan/chlorthalidone for HTN\par -pt got covid vaccines X2\par -Patient states he has felt like he has a chronic sore throat for a long time, has mentioned here before and his friend was diagnosed with throat cancer so not sure if it more a mental issue, patient has no postnasal drip/GERD/dysphagia

## 2021-04-07 NOTE — ASSESSMENT
[FreeTextEntry1] : ENT referral given.Venipuncture done in our office, Labs sent out. Patient advised to continue present medications with diet/exercise and specialists followup. Patient  will return to the office in sept for CP \par \par Dr. Dueñas was present in office building while I examined patient\par \par \par \par colonoscopy due in 2022 per colorectal\par specialists include\par 1. Orthopedic-Dr. Carranza\par 2. Cardiology-Dr. Lucas\par 3. Colorectal-Dr. Santos\par 4. -Dr. Talobt\par 5. Ophthalmology-Dr. Wright\par 6.Derm-Dr. iniguez\par + got covid vaccines, vaccines are UTD\par echo 10/2019\par CT lung=N/A

## 2021-04-19 ENCOUNTER — NON-APPOINTMENT (OUTPATIENT)
Age: 66
End: 2021-04-19

## 2021-04-20 ENCOUNTER — RX RENEWAL (OUTPATIENT)
Age: 66
End: 2021-04-20

## 2021-04-21 ENCOUNTER — APPOINTMENT (OUTPATIENT)
Dept: CARDIOLOGY | Facility: CLINIC | Age: 66
End: 2021-04-21
Payer: COMMERCIAL

## 2021-04-21 ENCOUNTER — NON-APPOINTMENT (OUTPATIENT)
Age: 66
End: 2021-04-21

## 2021-04-21 VITALS
HEART RATE: 90 BPM | HEIGHT: 73 IN | WEIGHT: 232 LBS | SYSTOLIC BLOOD PRESSURE: 132 MMHG | BODY MASS INDEX: 30.75 KG/M2 | TEMPERATURE: 97.6 F | OXYGEN SATURATION: 98 % | DIASTOLIC BLOOD PRESSURE: 74 MMHG

## 2021-04-21 DIAGNOSIS — I51.7 CARDIOMEGALY: ICD-10-CM

## 2021-04-21 DIAGNOSIS — D72.819 DECREASED WHITE BLOOD CELL COUNT, UNSPECIFIED: ICD-10-CM

## 2021-04-21 PROCEDURE — 99072 ADDL SUPL MATRL&STAF TM PHE: CPT

## 2021-04-21 PROCEDURE — 99214 OFFICE O/P EST MOD 30 MIN: CPT

## 2021-04-21 PROCEDURE — 93000 ELECTROCARDIOGRAM COMPLETE: CPT

## 2021-04-21 NOTE — ASSESSMENT
[FreeTextEntry1] : Patient's blood pressure is to goal.\par Continue with a statin therapy.\par Recent blood work shows that his white cell count is low.  Advised him to repeat CBC with differential.\par Calcium score given his risk factors.  He had one done about 6 years ago at that time his calcium score was 0.\par Patient was advised to partake in 150 minutes of moderate exercise per week.\par Repeat echocardiogram with LVH.\par patient was advised to see us in 6 months if stable and certainly earlier with any new symptoms.\par

## 2021-04-21 NOTE — REVIEW OF SYSTEMS
[Recent Weight Gain (___ Lbs)] : no recent weight gain [Feeling Fatigued] : not feeling fatigued [Recent Weight Loss (___ Lbs)] : no recent weight loss [Discharge From The Ears] : no discharge from the ears [Sore Throat] : no sore throat [Shortness Of Breath] : no shortness of breath [Dyspnea on exertion] : not dyspnea during exertion [Chest Pain] : no chest pain [Lower Ext Edema] : no extremity edema [Palpitations] : no palpitations [Cough] : no cough [Nausea] : no nausea [Heartburn] : no heartburn [Change in Appetite] : no change in appetite [Dysphagia] : no dysphagia [Urinary Frequency] : no change in urinary frequency [Impotence] : no impotence [Joint Pain] : no joint pain [Joint Swelling] : no joint swelling [Skin: A Rash] : no rash: [Skin Lesions] : no skin lesions [Dizziness] : no dizziness [Convulsions] : no convulsions [Anxiety] : no anxiety [Under Stress] : not under stress [Easy Bleeding] : no tendency for easy bleeding [Easy Bruising] : no tendency for easy bruising

## 2021-04-21 NOTE — PHYSICAL EXAM
[Well Groomed] : well groomed [General Appearance - In No Acute Distress] : no acute distress [Normal Conjunctiva] : the conjunctiva exhibited no abnormalities [Normal Oral Mucosa] : normal oral mucosa [No Oral Pallor] : no oral pallor [Normal Jugular Venous V Waves Present] : normal jugular venous V waves present [Exaggerated Use Of Accessory Muscles For Inspiration] : no accessory muscle use [Auscultation Breath Sounds / Voice Sounds] : lungs were clear to auscultation bilaterally [Heart Sounds] : normal S1 and S2 [Arterial Pulses Normal] : the arterial pulses were normal [Edema] : no peripheral edema present [FreeTextEntry1] : 2/6 daren walker [Bowel Sounds] : normal bowel sounds [Abdomen Soft] : soft [Abdomen Mass (___ Cm)] : no abdominal mass palpated [Abnormal Walk] : normal gait [Nail Clubbing] : no clubbing of the fingernails [Cyanosis, Localized] : no localized cyanosis [Skin Turgor] : normal skin turgor [] : no rash [Oriented To Time, Place, And Person] : oriented to person, place, and time [Impaired Insight] : insight and judgment were intact [Affect] : the affect was normal

## 2021-04-21 NOTE — HISTORY OF PRESENT ILLNESS
[FreeTextEntry1] : Very pleasant 66-year-old male who is here for evaluation due to hypertension hyperlipidemia and being overweight.\par He feels well and denies any chest pain or shortness of breath.  He has brought a treadmill and would like to exercise now.  He denies having any chest pain.  He has no shortness of breath with activity.  He has gained more weight compared to last year and now weighs 232 pounds.\par Patient is on Lipitor 10 mg which he takes regularly\par His blood pressure has been well controlled.  Recent blood work also showed that his potassium is low.  He is not taking potassium supplement.\par \par EKG with normal sinus rhythm, heart rate is 94 bpm, no ST-T changes.\par

## 2021-05-19 ENCOUNTER — TRANSCRIPTION ENCOUNTER (OUTPATIENT)
Age: 66
End: 2021-05-19

## 2021-05-19 ENCOUNTER — APPOINTMENT (OUTPATIENT)
Dept: CARDIOLOGY | Facility: CLINIC | Age: 66
End: 2021-05-19
Payer: COMMERCIAL

## 2021-05-19 PROCEDURE — 99072 ADDL SUPL MATRL&STAF TM PHE: CPT

## 2021-05-19 PROCEDURE — 93306 TTE W/DOPPLER COMPLETE: CPT

## 2021-05-21 DIAGNOSIS — N28.1 CYST OF KIDNEY, ACQUIRED: ICD-10-CM

## 2021-06-01 PROBLEM — N28.1 RENAL CYST, RIGHT: Status: ACTIVE | Noted: 2018-10-12

## 2021-07-06 NOTE — ASU DISCHARGE PLAN (ADULT/PEDIATRIC). - PATIENT/GUARDIAN NAME (SIGNATURE): ____________________________________
Yasmine Caban is a 22 month old who presents for the following health issues  accompanied by his mother    HPI     ENT/Cough Symptoms    Problem started: 7 days ago  Fever: YES  Runny nose: YES  Congestion: no  Sore Throat: unknown  Cough: YES  Eye discharge/redness:  YES  Ear Pain: no  Wheeze: no   Sick contacts: None;  Strep exposure: None;  Therapies Tried: Tylenol      Coughing for one week, worse last two days.  Fever 3-4 days ago and went way.  Deep cough.   Rattly.  ?breathing faster.    Not eating well since started.  Drinking ok.   Wetting ok.   No diarrhea.  No one sick at home.   Energy level somewhat down.  Runny nose.    No asthma or health isseus.    Bit of red throat.         2    Review of Systems   Constitutional, eye, ENT, skin, respiratory, cardiac, and GI are normal except as otherwise noted.      Objective    Pulse 131   Temp 99.2  F (37.3  C) (Axillary)   Resp 26   Wt 29 lb 13.5 oz (13.5 kg)   SpO2 97%   88 %ile (Z= 1.15) based on WHO (Boys, 0-2 years) weight-for-age data using vitals from 7/6/2021.     Physical Exam   GENERAL: Active, alert, in no acute distress.  SKIN: Clear. No significant rash, abnormal pigmentation or lesions  HEAD: Normocephalic.  EYES:  No discharge or erythema. Normal pupils and EOM.  EARS: Normal canals. Tympanic membranes are normal; gray and translucent.  NOSE: Normal without discharge.  MOUTH/THROAT: mild erythema on the tonsilar pilars.  NECK: Supple, no masses.  LYMPH NODES: No adenopathy  LUNGS: Clear. No rales, rhonchi, wheezing or retractions  HEART: Regular rhythm. Normal S1/S2. No murmurs.  ABDOMEN: Soft, non-tender, not distended, no masses or hepatosplenomegaly. Bowel sounds normal.     Diagnostics: None    ASSESSMENT:  Cough.  Getting wore pretty far into illness to be worsening.  Also some red throat.  Really outside chance of strep, more chance of bronchitis.  Diff long lasting viral URI.        
Statement Selected

## 2021-08-29 ENCOUNTER — TRANSCRIPTION ENCOUNTER (OUTPATIENT)
Age: 66
End: 2021-08-29

## 2021-08-30 ENCOUNTER — NON-APPOINTMENT (OUTPATIENT)
Age: 66
End: 2021-08-30

## 2021-08-31 ENCOUNTER — APPOINTMENT (OUTPATIENT)
Dept: INTERNAL MEDICINE | Facility: CLINIC | Age: 66
End: 2021-08-31
Payer: COMMERCIAL

## 2021-08-31 VITALS
SYSTOLIC BLOOD PRESSURE: 120 MMHG | DIASTOLIC BLOOD PRESSURE: 70 MMHG | OXYGEN SATURATION: 98 % | WEIGHT: 230 LBS | HEIGHT: 73 IN | HEART RATE: 91 BPM | BODY MASS INDEX: 30.48 KG/M2 | RESPIRATION RATE: 13 BRPM | TEMPERATURE: 97.7 F

## 2021-08-31 DIAGNOSIS — R68.89 OTHER GENERAL SYMPTOMS AND SIGNS: ICD-10-CM

## 2021-08-31 DIAGNOSIS — Z92.29 PERSONAL HISTORY OF OTHER DRUG THERAPY: ICD-10-CM

## 2021-08-31 DIAGNOSIS — Z11.59 ENCOUNTER FOR SCREENING FOR OTHER VIRAL DISEASES: ICD-10-CM

## 2021-08-31 DIAGNOSIS — Z23 ENCOUNTER FOR IMMUNIZATION: ICD-10-CM

## 2021-08-31 DIAGNOSIS — Z86.39 PERSONAL HISTORY OF OTHER ENDOCRINE, NUTRITIONAL AND METABOLIC DISEASE: ICD-10-CM

## 2021-08-31 PROCEDURE — 99213 OFFICE O/P EST LOW 20 MIN: CPT

## 2021-10-13 NOTE — ASSESSMENT
[Patient Optimized for Surgery] : Patient optimized for surgery [No Further Testing Recommended] : no further testing recommended [Continue medications as is] : Continue current medications [FreeTextEntry4] : 66-year-old male with good general health with controlled hypertension therefore no contraindication to planned low risk procedure.

## 2021-10-13 NOTE — HISTORY OF PRESENT ILLNESS
[No Pertinent Cardiac History] : no history of aortic stenosis, atrial fibrillation, coronary artery disease, recent myocardial infarction, or implantable device/pacemaker [No Pertinent Pulmonary History] : no history of asthma, COPD, sleep apnea, or smoking [No Adverse Anesthesia Reaction] : no adverse anesthesia reaction in self or family member [(Patient denies any chest pain, claudication, dyspnea on exertion, orthopnea, palpitations or syncope)] : Patient denies any chest pain, claudication, dyspnea on exertion, orthopnea, palpitations or syncope [Chronic Anticoagulation] : no chronic anticoagulation [Chronic Kidney Disease] : no chronic kidney disease [Diabetes] : no diabetes [FreeTextEntry1] : Right cataract [FreeTextEntry2] : September 9, 2021 [FreeTextEntry3] : Dr Wright [FreeTextEntry4] : 66-year-old male presents for evaluation prior to right cataract surgery.\par \par Patient general medical health is good with history of hypertension for which she takes losartan/ chlorthalidone and hyperlipidemia.\par \par Otherwise patient general health is good without any history of cardiopulmonary, hepatorenal, hematological or diabetes.\par \par Patient is feeling well without complaints including no chest pain, palpitations, shortness of breath or edema.

## 2021-10-13 NOTE — ADDENDUM
[FreeTextEntry1] : October 13, 2021:\par The patient remains medically stable and cleared for scheduled procedures

## 2021-12-08 ENCOUNTER — TRANSCRIPTION ENCOUNTER (OUTPATIENT)
Age: 66
End: 2021-12-08

## 2021-12-09 ENCOUNTER — NON-APPOINTMENT (OUTPATIENT)
Age: 66
End: 2021-12-09

## 2021-12-10 ENCOUNTER — TRANSCRIPTION ENCOUNTER (OUTPATIENT)
Age: 66
End: 2021-12-10

## 2021-12-20 ENCOUNTER — APPOINTMENT (OUTPATIENT)
Dept: INTERNAL MEDICINE | Facility: CLINIC | Age: 66
End: 2021-12-20
Payer: COMMERCIAL

## 2021-12-20 VITALS
BODY MASS INDEX: 30.75 KG/M2 | WEIGHT: 232 LBS | DIASTOLIC BLOOD PRESSURE: 70 MMHG | TEMPERATURE: 97 F | SYSTOLIC BLOOD PRESSURE: 120 MMHG | RESPIRATION RATE: 14 BRPM | HEART RATE: 70 BPM | HEIGHT: 73 IN

## 2021-12-20 DIAGNOSIS — Z23 ENCOUNTER FOR IMMUNIZATION: ICD-10-CM

## 2021-12-20 DIAGNOSIS — Z13.31 ENCOUNTER FOR SCREENING FOR DEPRESSION: ICD-10-CM

## 2021-12-20 PROCEDURE — 90686 IIV4 VACC NO PRSV 0.5 ML IM: CPT

## 2021-12-20 PROCEDURE — G0444 DEPRESSION SCREEN ANNUAL: CPT | Mod: 59

## 2021-12-20 PROCEDURE — 99396 PREV VISIT EST AGE 40-64: CPT | Mod: 25

## 2021-12-20 PROCEDURE — G0008: CPT

## 2021-12-20 PROCEDURE — 36415 COLL VENOUS BLD VENIPUNCTURE: CPT

## 2021-12-20 RX ORDER — LOTEPREDNOL ETABONATE 10 MG/ML
1 SUSPENSION TOPICAL
Qty: 3 | Refills: 0 | Status: DISCONTINUED | COMMUNITY
Start: 2021-12-07

## 2021-12-20 RX ORDER — TROPICAMIDE 10 MG/ML
1 SOLUTION/ DROPS OPHTHALMIC
Qty: 3 | Refills: 0 | Status: DISCONTINUED | COMMUNITY
Start: 2021-07-23

## 2021-12-20 RX ORDER — CYCLOBENZAPRINE HYDROCHLORIDE 10 MG/1
10 TABLET, FILM COATED ORAL
Qty: 90 | Refills: 0 | Status: DISCONTINUED | COMMUNITY
Start: 2021-06-30

## 2021-12-20 RX ORDER — BROMFENAC SODIUM 0.7 MG/ML
0.07 SOLUTION/ DROPS OPHTHALMIC
Qty: 3 | Refills: 0 | Status: DISCONTINUED | COMMUNITY
Start: 2021-09-20

## 2021-12-20 NOTE — COUNSELING
[Potential consequences of obesity discussed] : Potential consequences of obesity discussed [Benefits of weight loss discussed] : Benefits of weight loss discussed [Encouraged to increase physical activity] : Encouraged to increase physical activity [None] : None [Needs reinforcement, provided] : Patient needs reinforcement on understanding of lifestyle changes and steps needed to achieve self management goal; reinforcement was provided [de-identified] : Continue diet and exercise

## 2021-12-20 NOTE — HEALTH RISK ASSESSMENT
[Good] : ~his/her~ current health as good [Very Good] : ~his/her~  mood as very good [Yes] : Yes [2 - 3 times a week (3 pts)] : 2 - 3  times a week (3 points) [1 or 2 (0 pts)] : 1 or 2 (0 points) [Never (0 pts)] : Never (0 points) [No] : In the past 12 months have you used drugs other than those required for medical reasons? No [No falls in past year] : Patient reported no falls in the past year [0] : 2) Feeling down, depressed, or hopeless: Not at all (0) [None] : None [With Significant Other] : lives with significant other [Employed] : employed [College] : College [] :  [# Of Children ___] : has [unfilled] children [Fully functional (bathing, dressing, toileting, transferring, walking, feeding)] : Fully functional (bathing, dressing, toileting, transferring, walking, feeding) [Fully functional (using the telephone, shopping, preparing meals, housekeeping, doing laundry, using] : Fully functional and needs no help or supervision to perform IADLs (using the telephone, shopping, preparing meals, housekeeping, doing laundry, using transportation, managing medications and managing finances) [Smoke Detector] : smoke detector [Carbon Monoxide Detector] : carbon monoxide detector [Seat Belt] :  uses seat belt [Sunscreen] : uses sunscreen [Former] : Former [Reviewed no changes] : Reviewed, no changes [Designated Healthcare Proxy] : Designated healthcare proxy [Name: ___] : Health Care Proxy's Name: [unfilled]  [Audit-CScore] : 3 [de-identified] : treadmill [de-identified] : improved [[id number]] : 0 [Change in mental status noted] : No change in mental status noted [Reports changes in hearing] : Reports no changes in hearing [Reports changes in vision] : Reports no changes in vision [Reports changes in dental health] : Reports no changes in dental health [HepatitisCDate] : 01/14 [HepatitisCComments] : Negative [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

## 2021-12-20 NOTE — PHYSICAL EXAM
[General Appearance - Alert] : alert [General Appearance - In No Acute Distress] : in no acute distress [Sclera] : the sclera and conjunctiva were normal [PERRL With Normal Accommodation] : pupils were equal in size, round, and reactive to light [Extraocular Movements] : extraocular movements were intact [Outer Ear] : the ears and nose were normal in appearance [Oropharynx] : the oropharynx was normal [Neck Appearance] : the appearance of the neck was normal [Neck Cervical Mass (___cm)] : no neck mass was observed [Jugular Venous Distention Increased] : there was no jugular-venous distention [Thyroid Diffuse Enlargement] : the thyroid was not enlarged [Thyroid Nodule] : there were no palpable thyroid nodules [Auscultation Breath Sounds / Voice Sounds] : lungs were clear to auscultation bilaterally [Heart Rate And Rhythm] : heart rate was normal and rhythm regular [Heart Sounds] : normal S1 and S2 [Heart Sounds Gallop] : no gallops [Murmurs] : no murmurs [Heart Sounds Pericardial Friction Rub] : no pericardial rub [Arterial Pulses Carotid] : carotid pulses were normal with no bruits [Abdominal Aorta] : the abdominal aorta was normal [Arterial Pulses Femoral] : femoral pulses were normal without bruits [Full Pulse] : the pedal pulses are present [Edema] : there was no peripheral edema [Veins - Varicosity Changes] : there were no varicosital changes [Bowel Sounds] : normal bowel sounds [Abdomen Soft] : soft [Abdomen Tenderness] : non-tender [Abdomen Mass (___ Cm)] : no abdominal mass palpated [Cervical Lymph Nodes Enlarged Posterior Bilaterally] : posterior cervical [Cervical Lymph Nodes Enlarged Anterior Bilaterally] : anterior cervical [Supraclavicular Lymph Nodes Enlarged Bilaterally] : supraclavicular [Axillary Lymph Nodes Enlarged Bilaterally] : axillary [Femoral Lymph Nodes Enlarged Bilaterally] : femoral [Inguinal Lymph Nodes Enlarged Bilaterally] : inguinal [No Spinal Tenderness] : no spinal tenderness [Abnormal Walk] : normal gait [Nail Clubbing] : no clubbing  or cyanosis of the fingernails [Musculoskeletal - Swelling] : no joint swelling seen [Motor Tone] : muscle strength and tone were normal [Skin Color & Pigmentation] : normal skin color and pigmentation [Skin Turgor] : normal skin turgor [] : no rash [Cranial Nerves] : cranial nerves 2-12 were intact [No Focal Deficits] : no focal deficits [Oriented To Time, Place, And Person] : oriented to person, place, and time [Impaired Insight] : insight and judgment were intact [Affect] : the affect was normal [FreeTextEntry1] : via  [Over the Past 2 Weeks, Have You Felt Down, Depressed, or Hopeless?] : 1.) Over the past 2 weeks, have you felt down, depressed, or hopeless? No [Over the Past 2 Weeks, Have You Felt Little Interest or Pleasure Doing Things?] : 2.) Over the past 2 weeks, have you felt little interest or pleasure doing things? No

## 2021-12-20 NOTE — ASSESSMENT
[FreeTextEntry1] : 65-year-old male currently medically stable with controlled hypertension on present combination of losartan and chlorthalidone.\par \par Hyperlipidemia on Lipitor\par \par Patient with chronic sore throat and reflux therefore potential is throat symptoms secondary to reflux.\par Start pantoprazole 40 mg daily and given referral to see ENT to\par \par Patient again encouraged to continue his diet with regular exercise and weight loss.\par \par Colonoscopy April 2017 with followup in 5 years\par \par Followup with urology as scheduled\par \par influenza vaccine given left deltoid\par Received the COVID vaccine\par \par Followup in 6 months\par \par

## 2021-12-20 NOTE — HISTORY OF PRESENT ILLNESS
[FreeTextEntry1] : 66-year-old black male with good general medical health in for his yearly physical.\par \par The patient has history of hypertension on losartan for about 3 years. He had a cardiac evaluation October 2019 with echocardiogram with no significant issues and stress test negative other than elevated blood pressure. Chlorthalidone was added with improved BP\par It was also recommended by cardiology that the patient start Lipitor 10 mg daily which he is on\par \par He had a good lifestyle changes but has not kept up with it but plans to restart changes.\par \par Patient is generally feeling well without complaints including no chest pain, palpitations, shortness of breath or edema.\par \par Patient does complain of a chronic sore throat and associated reflux and he try over-the-counter Prilosec with improvement.\par \par He follows with urology secondary to erectile dysfunction and increased PSA and is on Cialis.\par He does complain of 2-3 times nocturia\par \par Significant DJD of his knees for which she has received injections with minimal help and likely will need knee replacements.\par \par The patient also has had significant issues with his eyes with having had surgery for glaucoma in the past and more recently had cataract surgery where things are now stable.\par \par The patient is  with 2 daughters and 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

## 2021-12-21 ENCOUNTER — TRANSCRIPTION ENCOUNTER (OUTPATIENT)
Age: 66
End: 2021-12-21

## 2021-12-21 ENCOUNTER — NON-APPOINTMENT (OUTPATIENT)
Age: 66
End: 2021-12-21

## 2021-12-21 LAB
ALBUMIN SERPL ELPH-MCNC: 4.6 G/DL
ALP BLD-CCNC: 76 U/L
ALT SERPL-CCNC: 17 U/L
ANION GAP SERPL CALC-SCNC: 14 MMOL/L
APPEARANCE: CLEAR
AST SERPL-CCNC: 39 U/L
BACTERIA: NEGATIVE
BASOPHILS # BLD AUTO: 0.01 K/UL
BASOPHILS NFR BLD AUTO: 0.3 %
BILIRUB SERPL-MCNC: 1.2 MG/DL
BILIRUBIN URINE: NEGATIVE
BLOOD URINE: NEGATIVE
BUN SERPL-MCNC: 12 MG/DL
CALCIUM SERPL-MCNC: 9.2 MG/DL
CHLORIDE SERPL-SCNC: 103 MMOL/L
CHOLEST SERPL-MCNC: 140 MG/DL
CO2 SERPL-SCNC: 25 MMOL/L
COLOR: YELLOW
CREAT SERPL-MCNC: 1.15 MG/DL
EOSINOPHIL # BLD AUTO: 0.13 K/UL
EOSINOPHIL NFR BLD AUTO: 3.3 %
ESTIMATED AVERAGE GLUCOSE: 97 MG/DL
GLUCOSE QUALITATIVE U: NEGATIVE
GLUCOSE SERPL-MCNC: 128 MG/DL
HBA1C MFR BLD HPLC: 5 %
HCT VFR BLD CALC: 42.2 %
HDLC SERPL-MCNC: 52 MG/DL
HGB BLD-MCNC: 14.5 G/DL
HYALINE CASTS: 1 /LPF
IMM GRANULOCYTES NFR BLD AUTO: 0 %
KETONES URINE: NEGATIVE
LDLC SERPL CALC-MCNC: 71 MG/DL
LEUKOCYTE ESTERASE URINE: NEGATIVE
LYMPHOCYTES # BLD AUTO: 1.23 K/UL
LYMPHOCYTES NFR BLD AUTO: 31.4 %
MAGNESIUM SERPL-MCNC: 1.9 MG/DL
MAN DIFF?: NORMAL
MCHC RBC-ENTMCNC: 30.6 PG
MCHC RBC-ENTMCNC: 34.4 GM/DL
MCV RBC AUTO: 89 FL
MICROSCOPIC-UA: NORMAL
MONOCYTES # BLD AUTO: 0.55 K/UL
MONOCYTES NFR BLD AUTO: 14 %
NEUTROPHILS # BLD AUTO: 2 K/UL
NEUTROPHILS NFR BLD AUTO: 51 %
NITRITE URINE: NEGATIVE
NONHDLC SERPL-MCNC: 88 MG/DL
PH URINE: 8
PLATELET # BLD AUTO: 208 K/UL
POTASSIUM SERPL-SCNC: 3.5 MMOL/L
PROT SERPL-MCNC: 7.4 G/DL
PROTEIN URINE: NEGATIVE
RBC # BLD: 4.74 M/UL
RBC # FLD: 12.1 %
RED BLOOD CELLS URINE: 1 /HPF
SODIUM SERPL-SCNC: 141 MMOL/L
SPECIFIC GRAVITY URINE: 1.01
SQUAMOUS EPITHELIAL CELLS: 1 /HPF
TRIGL SERPL-MCNC: 83 MG/DL
UROBILINOGEN URINE: ABNORMAL
WBC # FLD AUTO: 3.92 K/UL
WHITE BLOOD CELLS URINE: 0 /HPF

## 2021-12-30 ENCOUNTER — APPOINTMENT (OUTPATIENT)
Dept: OTOLARYNGOLOGY | Facility: CLINIC | Age: 66
End: 2021-12-30
Payer: COMMERCIAL

## 2021-12-30 VITALS
DIASTOLIC BLOOD PRESSURE: 79 MMHG | SYSTOLIC BLOOD PRESSURE: 119 MMHG | TEMPERATURE: 97.2 F | HEIGHT: 73 IN | HEART RATE: 78 BPM | WEIGHT: 230 LBS | BODY MASS INDEX: 30.48 KG/M2

## 2021-12-30 DIAGNOSIS — J31.2 CHRONIC PHARYNGITIS: ICD-10-CM

## 2021-12-30 PROCEDURE — 99203 OFFICE O/P NEW LOW 30 MIN: CPT | Mod: 25

## 2021-12-30 PROCEDURE — 31575 DIAGNOSTIC LARYNGOSCOPY: CPT

## 2021-12-30 NOTE — CONSULT LETTER
[Consult Letter:] : I had the pleasure of evaluating your patient, [unfilled]. [Please see my note below.] : Please see my note below. [Consult Closing:] : Thank you very much for allowing me to participate in the care of this patient.  If you have any questions, please do not hesitate to contact me. [Sincerely,] : Sincerely, [FreeTextEntry1] : Dear Dr. SHERIE MONREAL,\par \par Thank you for your kind referral. Please refer to my enclosed office notes for SINTIA CEDEÑO . If there are any questions free to contact me.\par  [FreeTextEntry3] : Lobito Donato MD, FACS\par

## 2021-12-30 NOTE — HISTORY OF PRESENT ILLNESS
[de-identified] : co  soreness throat pointing to laryngeal area past several mo\par has been on pantoprazole now 9 d getting better \par occasional heartburn symptoms, no dysphagia or hoarseness\par no pnd, seasonal allergies not now\par 2 y ago sore throat x 2 mo and resolved

## 2021-12-30 NOTE — ASSESSMENT
[FreeTextEntry1] : exam of larynx suggests mild laryngeal reflux\par continue pantoprazole 40 q d\par reflux diet\par consider change to famotidine if improving

## 2021-12-30 NOTE — PROCEDURE
[de-identified] : Indication for procedure:Unable to examine laryngeal structures with mirror exam.  Patient with excessive mucous in throat and fullness.\par The patient has chronic sore throat\par \par scope # 99\par Topical anesthesia is established with viscous xylocaine 2%.\par A flexible fiberoptic laryngoscope is introduced through the nares.\par No masses or inflammation is noted in the nasopharynx.\par The tongue base and valleculae are clear.\par The posterior pharyngeal wall is negative.  The hypopharynx is unobstructed.\par The supraglottic larynx is unremarkable.\par Both vocal cords are fully mobile without any polyp or nodule seen.  The vocal cords have no diffuse edema.\par There is mild edema overlying the arytenoid cartilages and inter-arytenoid space.\par  No erythema is noted the posterior larynx.\par The subglottic space is clear.\par The voice has a  normal quality.\par

## 2021-12-30 NOTE — REVIEW OF SYSTEMS
[Seasonal Allergies] : seasonal allergies [Post Nasal Drip] : post nasal drip [Throat Clearing] : throat clearing [Throat Pain] : throat pain [Throat Dryness] : throat dryness [Negative] : Heme/Lymph [Patient Intake Form Reviewed] : Patient intake form was reviewed

## 2022-01-04 ENCOUNTER — NON-APPOINTMENT (OUTPATIENT)
Age: 67
End: 2022-01-04

## 2022-01-13 ENCOUNTER — APPOINTMENT (OUTPATIENT)
Dept: DERMATOLOGY | Facility: CLINIC | Age: 67
End: 2022-01-13
Payer: COMMERCIAL

## 2022-01-13 PROCEDURE — 99213 OFFICE O/P EST LOW 20 MIN: CPT

## 2022-01-14 ENCOUNTER — NON-APPOINTMENT (OUTPATIENT)
Age: 67
End: 2022-01-14

## 2022-01-19 ENCOUNTER — NON-APPOINTMENT (OUTPATIENT)
Age: 67
End: 2022-01-19

## 2022-01-19 ENCOUNTER — TRANSCRIPTION ENCOUNTER (OUTPATIENT)
Age: 67
End: 2022-01-19

## 2022-02-18 ENCOUNTER — TRANSCRIPTION ENCOUNTER (OUTPATIENT)
Age: 67
End: 2022-02-18

## 2022-03-11 ENCOUNTER — OUTPATIENT (OUTPATIENT)
Dept: OUTPATIENT SERVICES | Facility: HOSPITAL | Age: 67
LOS: 1 days | Discharge: HOME | End: 2022-03-11
Payer: COMMERCIAL

## 2022-03-11 VITALS
HEART RATE: 88 BPM | HEIGHT: 73 IN | WEIGHT: 234.35 LBS | DIASTOLIC BLOOD PRESSURE: 84 MMHG | TEMPERATURE: 98 F | SYSTOLIC BLOOD PRESSURE: 138 MMHG | OXYGEN SATURATION: 97 % | RESPIRATION RATE: 18 BRPM

## 2022-03-11 DIAGNOSIS — M17.11 UNILATERAL PRIMARY OSTEOARTHRITIS, RIGHT KNEE: ICD-10-CM

## 2022-03-11 DIAGNOSIS — Z01.818 ENCOUNTER FOR OTHER PREPROCEDURAL EXAMINATION: ICD-10-CM

## 2022-03-11 DIAGNOSIS — H40.11X0 PRIMARY OPEN-ANGLE GLAUCOMA, STAGE UNSPECIFIED: Chronic | ICD-10-CM

## 2022-03-11 DIAGNOSIS — I10 ESSENTIAL (PRIMARY) HYPERTENSION: ICD-10-CM

## 2022-03-11 DIAGNOSIS — Z98.49 CATARACT EXTRACTION STATUS, UNSPECIFIED EYE: Chronic | ICD-10-CM

## 2022-03-11 DIAGNOSIS — Z98.890 OTHER SPECIFIED POSTPROCEDURAL STATES: Chronic | ICD-10-CM

## 2022-03-11 DIAGNOSIS — E78.5 HYPERLIPIDEMIA, UNSPECIFIED: ICD-10-CM

## 2022-03-11 DIAGNOSIS — H40.9 UNSPECIFIED GLAUCOMA: ICD-10-CM

## 2022-03-11 LAB
A1C WITH ESTIMATED AVERAGE GLUCOSE RESULT: 4.9 % — SIGNIFICANT CHANGE UP (ref 4–5.6)
ANION GAP SERPL CALC-SCNC: 3 MMOL/L — LOW (ref 5–17)
APTT BLD: 30.5 SEC — SIGNIFICANT CHANGE UP (ref 27.5–35.5)
BLD GP AB SCN SERPL QL: SIGNIFICANT CHANGE UP
BUN SERPL-MCNC: 15 MG/DL — SIGNIFICANT CHANGE UP (ref 7–23)
CALCIUM SERPL-MCNC: 9 MG/DL — SIGNIFICANT CHANGE UP (ref 8.5–10.1)
CHLORIDE SERPL-SCNC: 104 MMOL/L — SIGNIFICANT CHANGE UP (ref 96–108)
CO2 SERPL-SCNC: 32 MMOL/L — HIGH (ref 22–31)
CREAT SERPL-MCNC: 1.22 MG/DL — SIGNIFICANT CHANGE UP (ref 0.5–1.3)
EGFR: 66 ML/MIN/1.73M2 — SIGNIFICANT CHANGE UP
ESTIMATED AVERAGE GLUCOSE: 94 MG/DL — SIGNIFICANT CHANGE UP (ref 68–114)
GLUCOSE SERPL-MCNC: 108 MG/DL — HIGH (ref 70–99)
HCT VFR BLD CALC: 40.6 % — SIGNIFICANT CHANGE UP (ref 39–50)
HGB BLD-MCNC: 14.4 G/DL — SIGNIFICANT CHANGE UP (ref 13–17)
INR BLD: 1.15 RATIO — SIGNIFICANT CHANGE UP (ref 0.88–1.16)
MCHC RBC-ENTMCNC: 30.9 PG — SIGNIFICANT CHANGE UP (ref 27–34)
MCHC RBC-ENTMCNC: 35.5 G/DL — SIGNIFICANT CHANGE UP (ref 32–36)
MCV RBC AUTO: 87.1 FL — SIGNIFICANT CHANGE UP (ref 80–100)
MRSA PCR RESULT.: DETECTED
NRBC # BLD: 0 /100 WBCS — SIGNIFICANT CHANGE UP (ref 0–0)
PLATELET # BLD AUTO: 203 K/UL — SIGNIFICANT CHANGE UP (ref 150–400)
POTASSIUM SERPL-MCNC: 3.2 MMOL/L — LOW (ref 3.5–5.3)
POTASSIUM SERPL-SCNC: 3.2 MMOL/L — LOW (ref 3.5–5.3)
PROTHROM AB SERPL-ACNC: 13.8 SEC — HIGH (ref 10.5–13.4)
RBC # BLD: 4.66 M/UL — SIGNIFICANT CHANGE UP (ref 4.2–5.8)
RBC # FLD: 11.9 % — SIGNIFICANT CHANGE UP (ref 10.3–14.5)
S AUREUS DNA NOSE QL NAA+PROBE: DETECTED
SODIUM SERPL-SCNC: 139 MMOL/L — SIGNIFICANT CHANGE UP (ref 135–145)
WBC # BLD: 5.41 K/UL — SIGNIFICANT CHANGE UP (ref 3.8–10.5)
WBC # FLD AUTO: 5.41 K/UL — SIGNIFICANT CHANGE UP (ref 3.8–10.5)

## 2022-03-11 PROCEDURE — 93010 ELECTROCARDIOGRAM REPORT: CPT

## 2022-03-11 RX ORDER — TADALAFIL 10 MG/1
1 TABLET, FILM COATED ORAL
Qty: 0 | Refills: 0 | DISCHARGE

## 2022-03-11 RX ORDER — PSYLLIUM SEED (WITH DEXTROSE)
1 POWDER (GRAM) ORAL
Qty: 0 | Refills: 0 | DISCHARGE

## 2022-03-11 NOTE — PHYSICAL THERAPY INITIAL EVALUATION ADULT - LIVES WITH, PROFILE
Pt states he lives in pvt house with wife, has 5 steps with rail on one side to enter  at the front door, side door has 5 steps with no rails, inside the house has 16 steps to bedroom with rail on one side.

## 2022-03-11 NOTE — H&P PST ADULT - ASSESSMENT
67 year old male with a past medical history of HTN, HLD, GERD, and glaucoma c/o pain, swelling, and limited ROM to right  knee secondary to osteoarthritis  He is scheduled for a robotic assisted right total knee arthoplasty on 3/30/2022.    CAPRINI SCORE [CLOT]    AGE RELATED RISK FACTORS                                                       MOBILITY RELATED FACTORS  [ ] Age 41-60 years                                            (1 Point)                  [ ] Bed rest                                                        (1 Point)  x[ ] Age: 61-74 years                                           (2 Points)                 [ ] Plaster cast                                                   (2 Points)  [ ] Age= 75 years                                              (3 Points)                 [ ] Bed bound for more than 72 hours                 (2 Points)    DISEASE RELATED RISK FACTORS                                               GENDER SPECIFIC FACTORS  [ ] Edema in the lower extremities                       (1 Point)                  [ ] Pregnancy                                                     (1 Point)  [ ] Varicose veins                                               (1 Point)                  [ ] Post-partum < 6 weeks                                   (1 Point)             [x ] BMI > 25 Kg/m2                                            (1 Point)                  [ ] Hormonal therapy  or oral contraception          (1 Point)                 [ ] Sepsis (in the previous month)                        (1 Point)                  [ ] History of pregnancy complications                 (1 point)  [ ] Pneumonia or serious lung disease                                               [ ] Unexplained or recurrent                     (1 Point)           (in the previous month)                               (1 Point)  [ ] Abnormal pulmonary function test                     (1 Point)                 SURGERY RELATED RISK FACTORS  [ ] Acute myocardial infarction                              (1 Point)                 [ ]  Section                                             (1 Point)  [ ] Congestive heart failure (in the previous month)  (1 Point)               [ ] Minor surgery                                                  (1 Point)   [ ] Inflammatory bowel disease                             (1 Point)                 [ ] Arthroscopic surgery                                        (2 Points)  [ ] Central venous access                                      (2 Points)                [ ] General surgery lasting more than 45 minutes   (2 Points)       [ ] Stroke (in the previous month)                          (5 Points)               [ x] Elective arthroplasty                                         (5 Points)                                                                                                                                               HEMATOLOGY RELATED FACTORS                                                 TRAUMA RELATED RISK FACTORS  [ ] Prior episodes of VTE                                     (3 Points)                [ ] Fracture of the hip, pelvis, or leg                       (5 Points)  [ ] Positive family history for VTE                         (3 Points)                 [ ] Acute spinal cord injury (in the previous month)  (5 Points)  [ ] Prothrombin 46341 A                                     (3 Points)                 [ ] Paralysis  (less than 1 month)                             (5 Points)  [ ] Factor V Leiden                                             (3 Points)                  [ ] Multiple Trauma within 1 month                        (5 Points)  [ ] Lupus anticoagulants                                     (3 Points)                                                           [ ] Anticardiolipin antibodies                               (3 Points)                                                       [ ] High homocysteine in the blood                      (3 Points)                                             [ ] Other congenital or acquired thrombophilia      (3 Points)                                                [ ] Heparin induced thrombocytopenia                  (3 Points)                                          Total Score [     8     ]    Caprini Score 0 - 2:  Low Risk, No VTE Prophylaxis required for most patients, encourage ambulation  Caprini Score 3 - 6:  At Risk, pharmacologic VTE prophylaxis is indicated for most patients (in the absence of a contraindication)  Caprini Score Greater than or = 7:  High Risk, pharmacologic VTE prophylaxis is indicated for most patients (in the absence of a contraindication)    Caprini score indicates that the patient is high risk for VTE event ( score 6 or greater). Surgical patient's in this group will benefit from both pharmacologic prophylaxis and intermittent compression devices . Surgical team will determine the balance between VTE  risk and bleeding risk and other clinical considerations     Caprini score indicates that the patient is high risk for VTE event ( score 6 or greater). Surgical patient's in this group will benefit from both pharmacologic prophylaxis and intermittent compression devices . Surgical team will determine the balance between VTE  risk and bleeding risk and other clinical considerations

## 2022-03-11 NOTE — H&P PST ADULT - NSICDXFAMILYHX_GEN_ALL_CORE_FT
FAMILY HISTORY:  Father  Still living? No  Essential hypertension, Age at diagnosis: Age Unknown  Family history of prostate cancer, Age at diagnosis: Age Unknown    Mother  Still living? Yes, Estimated age: Age Unknown  Essential hypertension, Age at diagnosis: Age Unknown  Family history of dementia, Age at diagnosis: Age Unknown

## 2022-03-11 NOTE — H&P PST ADULT - NSICDXPASTSURGICALHX_GEN_ALL_CORE_FT
PAST SURGICAL HISTORY:  Chronic glaucoma left 2012  right 2016   s/p trabeculectomy    H/O cataract extraction     H/O colonoscopy with polypectomy 2012    S/P Hernia Surgery 1974

## 2022-03-11 NOTE — H&P PST ADULT - NSICDXPASTMEDICALHX_GEN_ALL_CORE_FT
PAST MEDICAL HISTORY:  Abdominal hernia without obstruction and without gangrene, recurrence not specified, unspecified hernia type     Anal fissure     Glaucoma     Hemorrhoids, unspecified hemorrhoid type     HLD (hyperlipidemia)     Other constipation     Primary osteoarthritis of both knees     Secondary cataract of left eye, unspecified secondary cataract type

## 2022-03-11 NOTE — PHYSICAL THERAPY INITIAL EVALUATION ADULT - ADDITIONAL COMMENTS
pt is independent in ADLs, ambulates without use of assistive device; in his bathroom he has walk in shower, no grab bars, fixed shower head, standard toilet seat height, 3 in 1 commode can fit

## 2022-03-11 NOTE — H&P PST ADULT - HISTORY OF PRESENT ILLNESS
67 year old male with a past medical history of HTN, HLD, GERD, and glaucoma c/o pain, swelling, and limited ROM to right  knee secondary to osteoarthritis  He is scheduled for a robotic assisted right total knee arthoplasty on 3/30/2022.    He denies fever, cough, SOB, sick contacts. Recent travel to [place]  (negative swab since)    Goal: to ride bike and hike without pain

## 2022-03-11 NOTE — H&P PST ADULT - NSANTHOSAYNRD_GEN_A_CORE
No. BRITTANI screening performed.  STOP BANG Legend: 0-2 = LOW Risk; 3-4 = INTERMEDIATE Risk; 5-8 = HIGH Risk

## 2022-03-13 NOTE — PHYSICAL EXAM
[No Acute Distress] : no acute distress [Well-Appearing] : well-appearing [Normal Sclera/Conjunctiva] : normal sclera/conjunctiva [No JVD] : no jugular venous distention [No Respiratory Distress] : no respiratory distress  [Normal Rate] : normal rate  [Clear to Auscultation] : lungs were clear to auscultation bilaterally [Regular Rhythm] : with a regular rhythm [Normal S1, S2] : normal S1 and S2 [No Murmur] : no murmur heard [No Edema] : there was no peripheral edema [Soft] : abdomen soft [Non Tender] : non-tender [No Focal Deficits] : no focal deficits [Normal Affect] : the affect was normal

## 2022-03-14 ENCOUNTER — APPOINTMENT (OUTPATIENT)
Dept: INTERNAL MEDICINE | Facility: CLINIC | Age: 67
End: 2022-03-14
Payer: COMMERCIAL

## 2022-03-14 VITALS
OXYGEN SATURATION: 98 % | BODY MASS INDEX: 30.48 KG/M2 | SYSTOLIC BLOOD PRESSURE: 120 MMHG | TEMPERATURE: 97.1 F | HEART RATE: 82 BPM | WEIGHT: 230 LBS | HEIGHT: 73 IN | DIASTOLIC BLOOD PRESSURE: 70 MMHG | RESPIRATION RATE: 12 BRPM

## 2022-03-14 DIAGNOSIS — E78.2 MIXED HYPERLIPIDEMIA: ICD-10-CM

## 2022-03-14 DIAGNOSIS — Z01.818 ENCOUNTER FOR OTHER PREPROCEDURAL EXAMINATION: ICD-10-CM

## 2022-03-14 PROCEDURE — 99214 OFFICE O/P EST MOD 30 MIN: CPT

## 2022-03-14 RX ORDER — LOTEPREDNOL ETABONATE 5 MG/ML
0.5 SUSPENSION/ DROPS OPHTHALMIC
Qty: 5 | Refills: 0 | Status: DISCONTINUED | COMMUNITY
Start: 2022-03-09

## 2022-03-14 RX ORDER — MUPIROCIN 20 MG/G
1 OINTMENT TOPICAL
Qty: 22 | Refills: 0
Start: 2022-03-14 | End: 2022-03-18

## 2022-03-21 LAB
ANION GAP SERPL CALC-SCNC: 12 MMOL/L
BUN SERPL-MCNC: 10 MG/DL
CALCIUM SERPL-MCNC: 8.9 MG/DL
CHLORIDE SERPL-SCNC: 101 MMOL/L
CO2 SERPL-SCNC: 26 MMOL/L
CREAT SERPL-MCNC: 1.2 MG/DL
EGFR: 67 ML/MIN/1.73M2
GLUCOSE SERPL-MCNC: 121 MG/DL
POTASSIUM SERPL-SCNC: 3.2 MMOL/L
SODIUM SERPL-SCNC: 139 MMOL/L

## 2022-03-25 LAB
ANION GAP SERPL CALC-SCNC: 14 MMOL/L
BUN SERPL-MCNC: 13 MG/DL
CALCIUM SERPL-MCNC: 9.5 MG/DL
CHLORIDE SERPL-SCNC: 103 MMOL/L
CO2 SERPL-SCNC: 24 MMOL/L
CREAT SERPL-MCNC: 1.18 MG/DL
EGFR: 68 ML/MIN/1.73M2
GLUCOSE SERPL-MCNC: 122 MG/DL
POTASSIUM SERPL-SCNC: 3.4 MMOL/L
SODIUM SERPL-SCNC: 141 MMOL/L

## 2022-03-25 NOTE — ASSESSMENT
[Patient Optimized for Surgery] : Patient optimized for surgery [No Further Testing Recommended] : no further testing recommended [Continue medications as is] : Continue current medications [As per surgery] : as per surgery [FreeTextEntry4] : 67-year-old male with good general health with controlled hypertension therefore no contraindication to planned  procedure.\par \par Patient instructed to increase potassium supplement to 4 daily\par \par Slightly increased PTT not significant

## 2022-03-25 NOTE — RESULTS/DATA
[] : results reviewed [de-identified] : WNL [de-identified] : WNL other than slightly increased PTT [de-identified] : WNL other than potassium = 3.2 [de-identified] : NSR, PAULETTEL [de-identified] : Repeat BMP with potassium = 3.4

## 2022-03-25 NOTE — HISTORY OF PRESENT ILLNESS
[No Pertinent Cardiac History] : no history of aortic stenosis, atrial fibrillation, coronary artery disease, recent myocardial infarction, or implantable device/pacemaker [No Pertinent Pulmonary History] : no history of asthma, COPD, sleep apnea, or smoking [No Adverse Anesthesia Reaction] : no adverse anesthesia reaction in self or family member [(Patient denies any chest pain, claudication, dyspnea on exertion, orthopnea, palpitations or syncope)] : Patient denies any chest pain, claudication, dyspnea on exertion, orthopnea, palpitations or syncope [Chronic Anticoagulation] : no chronic anticoagulation [Chronic Kidney Disease] : no chronic kidney disease [Diabetes] : no diabetes [FreeTextEntry1] : right total knee replacement [FreeTextEntry2] : March 30, 2022 [FreeTextEntry3] : Dr Ortiz [FreeTextEntry4] : 67-year-old male presents for medical evaluation prior to right knee replacement\par \par Patient general medical health is good with history of hypertension for which she takes losartan/ chlorthalidone and hyperlipidemia.\par \par Otherwise patient general health is good without any history of cardiopulmonary, hepatorenal, hematological or diabetes.\par \par Patient is feeling well without complaints including no chest pain, palpitations, shortness of breath or edema. [FreeTextEntry7] : Echocardiogram and stress test March 2019 = WNL

## 2022-03-29 ENCOUNTER — TRANSCRIPTION ENCOUNTER (OUTPATIENT)
Age: 67
End: 2022-03-29

## 2022-03-30 ENCOUNTER — TRANSCRIPTION ENCOUNTER (OUTPATIENT)
Age: 67
End: 2022-03-30

## 2022-03-30 ENCOUNTER — OUTPATIENT (OUTPATIENT)
Dept: OUTPATIENT SERVICES | Facility: HOSPITAL | Age: 67
LOS: 1 days | Discharge: HOME HEALTH | End: 2022-03-30

## 2022-03-30 ENCOUNTER — RESULT REVIEW (OUTPATIENT)
Age: 67
End: 2022-03-30

## 2022-03-30 ENCOUNTER — INPATIENT (INPATIENT)
Facility: HOSPITAL | Age: 67
LOS: 0 days | Discharge: HOME | End: 2022-03-31
Attending: ORTHOPAEDIC SURGERY | Admitting: ORTHOPAEDIC SURGERY
Payer: COMMERCIAL

## 2022-03-30 VITALS
WEIGHT: 235.01 LBS | TEMPERATURE: 98 F | OXYGEN SATURATION: 99 % | HEIGHT: 73 IN | HEART RATE: 98 BPM | SYSTOLIC BLOOD PRESSURE: 129 MMHG | RESPIRATION RATE: 18 BRPM | DIASTOLIC BLOOD PRESSURE: 79 MMHG

## 2022-03-30 DIAGNOSIS — M17.11 UNILATERAL PRIMARY OSTEOARTHRITIS, RIGHT KNEE: ICD-10-CM

## 2022-03-30 DIAGNOSIS — Z98.890 OTHER SPECIFIED POSTPROCEDURAL STATES: Chronic | ICD-10-CM

## 2022-03-30 DIAGNOSIS — H40.11X0 PRIMARY OPEN-ANGLE GLAUCOMA, STAGE UNSPECIFIED: Chronic | ICD-10-CM

## 2022-03-30 DIAGNOSIS — K21.9 GASTRO-ESOPHAGEAL REFLUX DISEASE WITHOUT ESOPHAGITIS: ICD-10-CM

## 2022-03-30 DIAGNOSIS — E78.5 HYPERLIPIDEMIA, UNSPECIFIED: ICD-10-CM

## 2022-03-30 DIAGNOSIS — Z98.49 CATARACT EXTRACTION STATUS, UNSPECIFIED EYE: Chronic | ICD-10-CM

## 2022-03-30 DIAGNOSIS — Z87.891 PERSONAL HISTORY OF NICOTINE DEPENDENCE: ICD-10-CM

## 2022-03-30 DIAGNOSIS — I10 ESSENTIAL (PRIMARY) HYPERTENSION: ICD-10-CM

## 2022-03-30 DIAGNOSIS — M17.12 UNILATERAL PRIMARY OSTEOARTHRITIS, LEFT KNEE: ICD-10-CM

## 2022-03-30 LAB
ANION GAP SERPL CALC-SCNC: 4 MMOL/L — LOW (ref 5–17)
BUN SERPL-MCNC: 13 MG/DL — SIGNIFICANT CHANGE UP (ref 7–23)
CALCIUM SERPL-MCNC: 8.5 MG/DL — SIGNIFICANT CHANGE UP (ref 8.5–10.1)
CHLORIDE SERPL-SCNC: 108 MMOL/L — SIGNIFICANT CHANGE UP (ref 96–108)
CO2 SERPL-SCNC: 28 MMOL/L — SIGNIFICANT CHANGE UP (ref 22–31)
CREAT SERPL-MCNC: 1.18 MG/DL — SIGNIFICANT CHANGE UP (ref 0.5–1.3)
EGFR: 68 ML/MIN/1.73M2 — SIGNIFICANT CHANGE UP
GLUCOSE SERPL-MCNC: 144 MG/DL — HIGH (ref 70–99)
HCT VFR BLD CALC: 37 % — LOW (ref 39–50)
HGB BLD-MCNC: 13 G/DL — SIGNIFICANT CHANGE UP (ref 13–17)
MCHC RBC-ENTMCNC: 31.3 PG — SIGNIFICANT CHANGE UP (ref 27–34)
MCHC RBC-ENTMCNC: 35.1 G/DL — SIGNIFICANT CHANGE UP (ref 32–36)
MCV RBC AUTO: 89.2 FL — SIGNIFICANT CHANGE UP (ref 80–100)
NRBC # BLD: 0 /100 WBCS — SIGNIFICANT CHANGE UP (ref 0–0)
PLATELET # BLD AUTO: 153 K/UL — SIGNIFICANT CHANGE UP (ref 150–400)
POTASSIUM SERPL-MCNC: 3.8 MMOL/L — SIGNIFICANT CHANGE UP (ref 3.5–5.3)
POTASSIUM SERPL-MCNC: 4 MMOL/L — SIGNIFICANT CHANGE UP (ref 3.5–5.3)
POTASSIUM SERPL-SCNC: 3.8 MMOL/L — SIGNIFICANT CHANGE UP (ref 3.5–5.3)
POTASSIUM SERPL-SCNC: 4 MMOL/L — SIGNIFICANT CHANGE UP (ref 3.5–5.3)
RBC # BLD: 4.15 M/UL — LOW (ref 4.2–5.8)
RBC # FLD: 11.9 % — SIGNIFICANT CHANGE UP (ref 10.3–14.5)
SODIUM SERPL-SCNC: 140 MMOL/L — SIGNIFICANT CHANGE UP (ref 135–145)
WBC # BLD: 4.97 K/UL — SIGNIFICANT CHANGE UP (ref 3.8–10.5)
WBC # FLD AUTO: 4.97 K/UL — SIGNIFICANT CHANGE UP (ref 3.8–10.5)

## 2022-03-30 PROCEDURE — 73560 X-RAY EXAM OF KNEE 1 OR 2: CPT | Mod: 26,RT

## 2022-03-30 PROCEDURE — 88305 TISSUE EXAM BY PATHOLOGIST: CPT | Mod: 26

## 2022-03-30 PROCEDURE — 88311 DECALCIFY TISSUE: CPT | Mod: 26

## 2022-03-30 DEVICE — INSERT TIB BEARING CS X3 SZ 6 9MM: Type: IMPLANTABLE DEVICE | Site: RIGHT | Status: FUNCTIONAL

## 2022-03-30 DEVICE — PIN FIX 3.2X140MM STRL: Type: IMPLANTABLE DEVICE | Site: RIGHT | Status: FUNCTIONAL

## 2022-03-30 DEVICE — PATELLA TRIATHLON ASSYM SZ A3X10MM: Type: IMPLANTABLE DEVICE | Site: RIGHT | Status: FUNCTIONAL

## 2022-03-30 DEVICE — COMP FEM CRUCIATE RETAINING: Type: IMPLANTABLE DEVICE | Site: RIGHT | Status: FUNCTIONAL

## 2022-03-30 DEVICE — PIN FIX 3.2X110MM STRL: Type: IMPLANTABLE DEVICE | Site: RIGHT | Status: FUNCTIONAL

## 2022-03-30 DEVICE — BASEPLATE TIB TRIATHLON TRITAN SZ 6: Type: IMPLANTABLE DEVICE | Site: RIGHT | Status: FUNCTIONAL

## 2022-03-30 RX ORDER — HYDROMORPHONE HYDROCHLORIDE 2 MG/ML
0.5 INJECTION INTRAMUSCULAR; INTRAVENOUS; SUBCUTANEOUS
Refills: 0 | Status: DISCONTINUED | OUTPATIENT
Start: 2022-03-30 | End: 2022-03-30

## 2022-03-30 RX ORDER — ATORVASTATIN CALCIUM 80 MG/1
1 TABLET, FILM COATED ORAL
Qty: 0 | Refills: 0 | DISCHARGE

## 2022-03-30 RX ORDER — POLYETHYLENE GLYCOL 3350 17 G/17G
17 POWDER, FOR SOLUTION ORAL ONCE
Refills: 0 | Status: COMPLETED | OUTPATIENT
Start: 2022-03-30 | End: 2022-03-30

## 2022-03-30 RX ORDER — ACETAMINOPHEN 500 MG
650 TABLET ORAL ONCE
Refills: 0 | Status: COMPLETED | OUTPATIENT
Start: 2022-03-30 | End: 2022-03-30

## 2022-03-30 RX ORDER — ONDANSETRON 8 MG/1
4 TABLET, FILM COATED ORAL ONCE
Refills: 0 | Status: DISCONTINUED | OUTPATIENT
Start: 2022-03-30 | End: 2022-03-30

## 2022-03-30 RX ORDER — OXYCODONE HYDROCHLORIDE 5 MG/1
5 TABLET ORAL EVERY 4 HOURS
Refills: 0 | Status: DISCONTINUED | OUTPATIENT
Start: 2022-03-30 | End: 2022-03-31

## 2022-03-30 RX ORDER — LOSARTAN POTASSIUM 100 MG/1
1 TABLET, FILM COATED ORAL
Qty: 0 | Refills: 0 | DISCHARGE

## 2022-03-30 RX ORDER — SODIUM CHLORIDE 9 MG/ML
1000 INJECTION INTRAMUSCULAR; INTRAVENOUS; SUBCUTANEOUS
Refills: 0 | Status: DISCONTINUED | OUTPATIENT
Start: 2022-03-30 | End: 2022-03-31

## 2022-03-30 RX ORDER — ONDANSETRON 8 MG/1
4 TABLET, FILM COATED ORAL EVERY 6 HOURS
Refills: 0 | Status: DISCONTINUED | OUTPATIENT
Start: 2022-03-30 | End: 2022-03-31

## 2022-03-30 RX ORDER — POTASSIUM CHLORIDE 20 MEQ
3 PACKET (EA) ORAL
Qty: 0 | Refills: 0 | DISCHARGE

## 2022-03-30 RX ORDER — LOTEPREDNOL ETABONATE 2 MG/ML
2 SUSPENSION/ DROPS OPHTHALMIC
Qty: 0 | Refills: 0 | DISCHARGE

## 2022-03-30 RX ORDER — ACETAMINOPHEN 500 MG
1000 TABLET ORAL ONCE
Refills: 0 | Status: DISCONTINUED | OUTPATIENT
Start: 2022-03-30 | End: 2022-03-31

## 2022-03-30 RX ORDER — CELECOXIB 200 MG/1
200 CAPSULE ORAL EVERY 12 HOURS
Refills: 0 | Status: DISCONTINUED | OUTPATIENT
Start: 2022-03-31 | End: 2022-03-31

## 2022-03-30 RX ORDER — PANTOPRAZOLE SODIUM 20 MG/1
40 TABLET, DELAYED RELEASE ORAL
Refills: 0 | Status: DISCONTINUED | OUTPATIENT
Start: 2022-03-30 | End: 2022-03-31

## 2022-03-30 RX ORDER — ASCORBIC ACID 60 MG
500 TABLET,CHEWABLE ORAL
Refills: 0 | Status: DISCONTINUED | OUTPATIENT
Start: 2022-03-30 | End: 2022-03-31

## 2022-03-30 RX ORDER — ACETAMINOPHEN 500 MG
975 TABLET ORAL EVERY 8 HOURS
Refills: 0 | Status: DISCONTINUED | OUTPATIENT
Start: 2022-03-30 | End: 2022-03-31

## 2022-03-30 RX ORDER — ASPIRIN/CALCIUM CARB/MAGNESIUM 324 MG
81 TABLET ORAL
Refills: 0 | Status: DISCONTINUED | OUTPATIENT
Start: 2022-03-31 | End: 2022-03-31

## 2022-03-30 RX ORDER — OXYCODONE HYDROCHLORIDE 5 MG/1
10 TABLET ORAL EVERY 4 HOURS
Refills: 0 | Status: DISCONTINUED | OUTPATIENT
Start: 2022-03-30 | End: 2022-03-31

## 2022-03-30 RX ORDER — FAMOTIDINE 10 MG/ML
20 INJECTION INTRAVENOUS
Refills: 0 | Status: DISCONTINUED | OUTPATIENT
Start: 2022-03-30 | End: 2022-03-30

## 2022-03-30 RX ORDER — FAMOTIDINE 10 MG/ML
1 INJECTION INTRAVENOUS
Qty: 0 | Refills: 0 | DISCHARGE

## 2022-03-30 RX ORDER — HYDROMORPHONE HYDROCHLORIDE 2 MG/ML
1 INJECTION INTRAMUSCULAR; INTRAVENOUS; SUBCUTANEOUS
Refills: 0 | Status: DISCONTINUED | OUTPATIENT
Start: 2022-03-30 | End: 2022-03-30

## 2022-03-30 RX ORDER — PANTOPRAZOLE SODIUM 20 MG/1
1 TABLET, DELAYED RELEASE ORAL
Qty: 0 | Refills: 0 | DISCHARGE

## 2022-03-30 RX ORDER — KETOROLAC TROMETHAMINE 30 MG/ML
30 SYRINGE (ML) INJECTION EVERY 6 HOURS
Refills: 0 | Status: COMPLETED | OUTPATIENT
Start: 2022-03-30 | End: 2022-03-31

## 2022-03-30 RX ORDER — VANCOMYCIN HCL 1 G
1500 VIAL (EA) INTRAVENOUS ONCE
Refills: 0 | Status: COMPLETED | OUTPATIENT
Start: 2022-03-30 | End: 2022-03-30

## 2022-03-30 RX ORDER — DEXAMETHASONE 0.5 MG/5ML
10 ELIXIR ORAL ONCE
Refills: 0 | Status: COMPLETED | OUTPATIENT
Start: 2022-03-31 | End: 2022-03-31

## 2022-03-30 RX ORDER — CEFAZOLIN SODIUM 1 G
2000 VIAL (EA) INJECTION EVERY 8 HOURS
Refills: 0 | Status: COMPLETED | OUTPATIENT
Start: 2022-03-30 | End: 2022-03-31

## 2022-03-30 RX ORDER — LANOLIN ALCOHOL/MO/W.PET/CERES
3 CREAM (GRAM) TOPICAL AT BEDTIME
Refills: 0 | Status: DISCONTINUED | OUTPATIENT
Start: 2022-03-30 | End: 2022-03-31

## 2022-03-30 RX ORDER — CHLORTHALIDONE 50 MG
1 TABLET ORAL
Qty: 0 | Refills: 0 | DISCHARGE

## 2022-03-30 RX ORDER — SODIUM CHLORIDE 9 MG/ML
3 INJECTION INTRAMUSCULAR; INTRAVENOUS; SUBCUTANEOUS EVERY 8 HOURS
Refills: 0 | Status: DISCONTINUED | OUTPATIENT
Start: 2022-03-30 | End: 2022-03-30

## 2022-03-30 RX ORDER — CELECOXIB 200 MG/1
200 CAPSULE ORAL ONCE
Refills: 0 | Status: COMPLETED | OUTPATIENT
Start: 2022-03-30 | End: 2022-03-30

## 2022-03-30 RX ORDER — SENNA PLUS 8.6 MG/1
2 TABLET ORAL AT BEDTIME
Refills: 0 | Status: DISCONTINUED | OUTPATIENT
Start: 2022-03-30 | End: 2022-03-31

## 2022-03-30 RX ORDER — LOSARTAN POTASSIUM 100 MG/1
100 TABLET, FILM COATED ORAL DAILY
Refills: 0 | Status: DISCONTINUED | OUTPATIENT
Start: 2022-03-30 | End: 2022-03-31

## 2022-03-30 RX ORDER — ATORVASTATIN CALCIUM 80 MG/1
10 TABLET, FILM COATED ORAL AT BEDTIME
Refills: 0 | Status: DISCONTINUED | OUTPATIENT
Start: 2022-03-30 | End: 2022-03-31

## 2022-03-30 RX ORDER — SODIUM CHLORIDE 9 MG/ML
1000 INJECTION, SOLUTION INTRAVENOUS
Refills: 0 | Status: DISCONTINUED | OUTPATIENT
Start: 2022-03-30 | End: 2022-03-30

## 2022-03-30 RX ORDER — BENZOCAINE AND MENTHOL 5; 1 G/100ML; G/100ML
1 LIQUID ORAL EVERY 4 HOURS
Refills: 0 | Status: DISCONTINUED | OUTPATIENT
Start: 2022-03-30 | End: 2022-03-31

## 2022-03-30 RX ORDER — HYDROMORPHONE HYDROCHLORIDE 2 MG/ML
0.5 INJECTION INTRAMUSCULAR; INTRAVENOUS; SUBCUTANEOUS ONCE
Refills: 0 | Status: DISCONTINUED | OUTPATIENT
Start: 2022-03-30 | End: 2022-03-31

## 2022-03-30 RX ORDER — PANTOPRAZOLE SODIUM 20 MG/1
40 TABLET, DELAYED RELEASE ORAL
Refills: 0 | Status: DISCONTINUED | OUTPATIENT
Start: 2022-03-30 | End: 2022-03-30

## 2022-03-30 RX ORDER — MAGNESIUM HYDROXIDE 400 MG/1
30 TABLET, CHEWABLE ORAL DAILY
Refills: 0 | Status: DISCONTINUED | OUTPATIENT
Start: 2022-03-30 | End: 2022-03-31

## 2022-03-30 RX ADMIN — Medication 975 MILLIGRAM(S): at 14:22

## 2022-03-30 RX ADMIN — OXYCODONE HYDROCHLORIDE 5 MILLIGRAM(S): 5 TABLET ORAL at 18:48

## 2022-03-30 RX ADMIN — OXYCODONE HYDROCHLORIDE 5 MILLIGRAM(S): 5 TABLET ORAL at 14:22

## 2022-03-30 RX ADMIN — SODIUM CHLORIDE 125 MILLILITER(S): 9 INJECTION INTRAMUSCULAR; INTRAVENOUS; SUBCUTANEOUS at 23:31

## 2022-03-30 RX ADMIN — SENNA PLUS 2 TABLET(S): 8.6 TABLET ORAL at 21:37

## 2022-03-30 RX ADMIN — Medication 300 MILLIGRAM(S): at 07:18

## 2022-03-30 RX ADMIN — Medication 650 MILLIGRAM(S): at 07:15

## 2022-03-30 RX ADMIN — Medication 30 MILLIGRAM(S): at 20:28

## 2022-03-30 RX ADMIN — Medication 30 MILLIGRAM(S): at 20:12

## 2022-03-30 RX ADMIN — SODIUM CHLORIDE 100 MILLILITER(S): 9 INJECTION, SOLUTION INTRAVENOUS at 11:43

## 2022-03-30 RX ADMIN — Medication 975 MILLIGRAM(S): at 15:22

## 2022-03-30 RX ADMIN — Medication 500 MILLIGRAM(S): at 17:48

## 2022-03-30 RX ADMIN — Medication 975 MILLIGRAM(S): at 21:35

## 2022-03-30 RX ADMIN — OXYCODONE HYDROCHLORIDE 5 MILLIGRAM(S): 5 TABLET ORAL at 21:36

## 2022-03-30 RX ADMIN — Medication 975 MILLIGRAM(S): at 22:35

## 2022-03-30 RX ADMIN — POLYETHYLENE GLYCOL 3350 17 GRAM(S): 17 POWDER, FOR SOLUTION ORAL at 20:26

## 2022-03-30 RX ADMIN — OXYCODONE HYDROCHLORIDE 5 MILLIGRAM(S): 5 TABLET ORAL at 15:22

## 2022-03-30 RX ADMIN — Medication 3 MILLIGRAM(S): at 21:36

## 2022-03-30 RX ADMIN — Medication 100 MILLIGRAM(S): at 17:13

## 2022-03-30 RX ADMIN — SODIUM CHLORIDE 125 MILLILITER(S): 9 INJECTION INTRAMUSCULAR; INTRAVENOUS; SUBCUTANEOUS at 14:32

## 2022-03-30 RX ADMIN — OXYCODONE HYDROCHLORIDE 5 MILLIGRAM(S): 5 TABLET ORAL at 17:48

## 2022-03-30 RX ADMIN — SODIUM CHLORIDE 3 MILLILITER(S): 9 INJECTION INTRAMUSCULAR; INTRAVENOUS; SUBCUTANEOUS at 06:51

## 2022-03-30 RX ADMIN — CELECOXIB 200 MILLIGRAM(S): 200 CAPSULE ORAL at 07:14

## 2022-03-30 RX ADMIN — OXYCODONE HYDROCHLORIDE 5 MILLIGRAM(S): 5 TABLET ORAL at 22:35

## 2022-03-30 NOTE — ASU PREOP CHECKLIST - HEIGHT IN INCHES
Peripheral nerve/Neuraxial procedure note : epidural single  Pre-Procedure    Location: pre-op    Procedure Times:3/17/2020 3:08 PM and 3/17/2020 3:19 PM  Pre-Anesthestic Checklist: patient identified, risks and benefits discussed, informed consent and pre-op evaluation    Timeout  Correct Patient: No  Correct Procedure: No  Correct Site: No  Correct Laterality: N/A   Correct Position: No  Site Marked: N/A   .   Procedure Documentation    Diagnosis:Post dural puncture headache.    Procedure: epidural single, .   Patient Position:sitting Insertion Site:L2-3  (midline approach) Injection technique: LORT saline and single-shot   Local skin infiltrated with 9 mL of 1% lidocaine.  NISHANT at 5 cm    Patient Prep/Sterile Barriers; mask, sterile gloves, povidone-iodine 7.5% surgical scrub, patient draped.  .  Needle: Touhy needle   Needle Gauge: 17.    Needle Length (Inches) 3.5   # of attempts: 1 and  # of redirects:  1 .    . .   .  .   Blood patch amount 20 mL.    Assessment/Narrative  Paresthesias: No.  .  .  Aspiration negative for heme or CSF  . Comments:  Tolerated blood patch well, stated relief of HA after procedure.  Maintained in supine position x one hour post injection.  Instructed to call if VERA returns.            
1

## 2022-03-30 NOTE — DISCHARGE NOTE PROVIDER - CARE PROVIDER_API CALL
Reggie Ortiz)  Orthopaedic Surgery  [address] [place] [address]  Phone: [phone]  Fax: [phone]  Follow Up Time:

## 2022-03-30 NOTE — PHYSICAL THERAPY INITIAL EVALUATION ADULT - ACTIVE RANGE OF MOTION EXAMINATION, REHAB EVAL
R knee ext 0* in supine and 90* flex in sitt/bilateral upper extremity Active ROM was WFL (within functional limits)/bilateral  lower extremity Active ROM was WFL (within functional limits)

## 2022-03-30 NOTE — DISCHARGE NOTE NURSING/CASE MANAGEMENT/SOCIAL WORK - FLU SEASON?
Yes... Tetracycline Counseling: Patient counseled regarding possible photosensitivity and increased risk for sunburn.  Patient instructed to avoid sunlight, if possible.  When exposed to sunlight, patients should wear protective clothing, sunglasses, and sunscreen.  The patient was instructed to call the office immediately if the following severe adverse effects occur:  hearing changes, easy bruising/bleeding, severe headache, or vision changes.  The patient verbalized understanding of the proper use and possible adverse effects of tetracycline.  All of the patient's questions and concerns were addressed. Patient understands to avoid pregnancy while on therapy due to potential birth defects.

## 2022-03-30 NOTE — DISCHARGE NOTE PROVIDER - HOSPITAL COURSE
67yMale with history of Right knee OA presenting for R TKA by Dr. Ortiz on 3/30/22. Risk and benefits of surgery were explained to the patient. The patient understood and agreed to proceed with surgery. Patient underwent the procedure with no intraoperative complications. Pt was brought in stable condition to the PACU. Once stable in PACU, pt was brought to the floor. During hospital stay pt was followed by Medicine, physical therapy, Home Care during this admission. Pt had an uneventful hospital course. Pt is stable for discharge to home 67yMale with history of Right knee OA presenting for R TKA by Dr. Ortiz on 3/30/22. Risk and benefits of surgery were explained to the patient. The patient understood and agreed to proceed with surgery. Patient underwent the procedure with no intraoperative complications. Pt was brought in stable condition to the PACU. Once stable in PACU, pt was brought to the floor. During hospital stay pt was followed by Medicine, physical therapy, Home Care during this admission. Pt had an uneventful hospital course. Pt is stable for discharge to home on POD#1.

## 2022-03-30 NOTE — BRIEF OPERATIVE NOTE - DISPOSITION
PACU
Eyes with no visual disturbances.  Ears clean and dry and no hearing difficulties. Nose with pink mucosa and no drainage.  Mouth mucous membranes moist and pink.  No tenderness or swelling to throat or neck.

## 2022-03-30 NOTE — DISCHARGE NOTE NURSING/CASE MANAGEMENT/SOCIAL WORK - NSDCFUADDAPPT_GEN_ALL_CORE_FT
Follow up with your surgeon in two weeks. Call for appointment.  If you need more pain medication, call your surgeon's office. For medication refills or authorizations, please call [phone]  We recommend that you call and schedule a follow up appointment within 2-4 weeks with your primary care physician for repeat blood work (CBC and BMP) for post hospital discharge follow-up care.  Call your surgeon if you have increased redness/pain/drainage or fever. Return to ER for shortness of breath/calf tenderness.

## 2022-03-30 NOTE — DISCHARGE NOTE PROVIDER - NSDCFUADDAPPT_GEN_ALL_CORE_FT
Follow up with your surgeon in two weeks. Call for appointment.  If you need more pain medication, call your surgeon's office. For medication refills or authorizations, please call [phone]  We recommend that you call and schedule a follow up appointment within 2-4 weeks with your primary care physician for repeat blood work (CBC and BMP) for post hospital discharge follow-up care.  Call your surgeon if you have increased redness/pain/drainage or fever. Return to ER for shortness of breath/calf tenderness.

## 2022-03-30 NOTE — OCCUPATIONAL THERAPY INITIAL EVALUATION ADULT - ADDITIONAL COMMENTS
Pre op assessment confirmed - Pt states he lives in private house with wife with 5 steps with rail on one side to enter. Pt has to navigate 16 steps inside the house bedroom with rail on one side. Pt has access to both a walk-in shower and tub/shower at home, with a bench inside the shower. Pt states 3:1 commode can fit over toilet.

## 2022-03-30 NOTE — DISCHARGE NOTE NURSING/CASE MANAGEMENT/SOCIAL WORK - NSDCPEFALRISK_GEN_ALL_CORE
For information on Fall & Injury Prevention, visit: [email or web address] OR  [email or web address] OR  https://www.cdc.gov/steadi/patient.html

## 2022-03-30 NOTE — PATIENT PROFILE ADULT - FALL HARM RISK - UNIVERSAL INTERVENTIONS
Bed in lowest position, wheels locked, appropriate side rails in place/Call bell, personal items and telephone in reach/Instruct patient to call for assistance before getting out of bed or chair/Non-slip footwear when patient is out of bed/[place] to call system/Physically safe environment - no spills, clutter or unnecessary equipment/Purposeful Proactive Rounding/Room/bathroom lighting operational, light cord in reach

## 2022-03-30 NOTE — PHYSICAL THERAPY INITIAL EVALUATION ADULT - ADDITIONAL COMMENTS
Pt states he lives in pvt house with wife, has 4 steps with rail on Right to enter  at side entrance , inside the house has 16 steps to bedroom with LHR and wall on the other  side  pt is independent in ADLs, ambulates without use of assistive device; in his bathroom he has walk in shower, no grab bars, fixed shower head, standard toilet seat height, 3 in 1 commode can fit

## 2022-03-30 NOTE — DISCHARGE NOTE PROVIDER - NSDCFUSCHEDAPPT_GEN_ALL_CORE_FT
SINTIA CEDEÑO JR ; 04/04/2022 ; NPP Intmed [address]   SINTIA CEDEÑO JR ; 04/11/2022 ; NPP ONCORTHO [address] [facility]  SINTIA CEDEÑO JR ; 04/14/2022 ; NPP Cardio [address] [facility]

## 2022-03-30 NOTE — PHYSICAL THERAPY INITIAL EVALUATION ADULT - STAIR PATTERN, REHAB EVAL
[id number] steps c CS-3 steps c RHR sideways & 12 steps c LHR & SAC . Performed simulated car transfer with CS/step to step

## 2022-03-30 NOTE — DISCHARGE NOTE NURSING/CASE MANAGEMENT/SOCIAL WORK - PATIENT PORTAL LINK FT
You can access the FollowMyHealth Patient Portal offered by [facility] by registering at the following website: [email or web address]. By joining [facility]’s FollowMyHealth portal, you will also be able to view your health information using other applications (apps) compatible with our system.

## 2022-03-30 NOTE — DISCHARGE NOTE PROVIDER - NSDCFUADDINST_GEN_ALL_CORE_FT
Keep knee straight while at rest. Elevate the leg as much as possible ("toes above the nose") to help control swelling. Make sure you get up and take a brief walk every two hours to help with circulation and prevent stiffness. Incentive spirometer 10X/hour. Cryocuff to help with pain/inflammation.     Per Dr. Ortiz: may advance from walker as tolerated per discretion of physical therapist.     Keep Prineo Dressing Clean, Dry and Intact. May shower with Prineo Dressing. Please do not scrub, soak, peel or pick at the prineo dressing. No creams, lotions, or oils over dressing. May shower and let water run over incision, no baths. Pat dry once out of shower. Dressing to be removed in office at follow up visit in 2 weeks. There are no staples or stitches that need to be removed.  If you notice any redness, irritation, or itching around the prineo dressing call the surgeon's office

## 2022-03-30 NOTE — DISCHARGE NOTE PROVIDER - NSDCMRMEDTOKEN_GEN_ALL_CORE_FT
atorvastatin 10 mg oral tablet: 1 tab(s) orally once a day  chlorthalidone 25 mg oral tablet: 1 tab(s) orally once a day  famotidine 20 mg oral tablet: 1 tab(s) orally 2 times a day  losartan 100 mg oral tablet: 1 tab(s) orally once a day  loteprednol 0.5% ophthalmic suspension: 2 drop(s) to each affected eye 4 times a day  mupirocin 2% topical ointment: Apply topically to affected area 2 times a day   pantoprazole 40 mg oral delayed release tablet: 1 tab(s) orally once a day  potassium chloride 20 mEq oral tablet, extended release: 3 tab(s) orally once a day   acetaminophen 325 mg oral tablet: 3 tab(s) orally every 8 hours  ascorbic acid 500 mg oral tablet: 1 tab(s) orally 2 times a day  Aspirin Enteric Coated 81 mg oral delayed release tablet: 1 tab(s) orally 2 times a day MDD:2  atorvastatin 10 mg oral tablet: 1 tab(s) orally once a day  celecoxib 200 mg oral capsule: 1 cap(s) orally every 12 hours MDD:2  chlorthalidone 25 mg oral tablet: 1 tab(s) orally once a day  famotidine 20 mg oral tablet: 1 tab(s) orally 2 times a day  losartan 100 mg oral tablet: 1 tab(s) orally once a day  loteprednol 0.5% ophthalmic suspension: 2 drop(s) to each affected eye 4 times a day  Multiple Vitamins oral tablet: 1 tab(s) orally once a day  oxyCODONE 5 mg oral tablet: 1- 2 tab(s) orally every 4 hours, As Needed -Pain MDD:10  pantoprazole 40 mg oral delayed release tablet: 1 tab(s) orally once a day  potassium chloride 20 mEq oral tablet, extended release: 3 tab(s) orally once a day  senna oral tablet: 2 tab(s) orally once a day (at bedtime)

## 2022-03-30 NOTE — CONSULT NOTE ADULT - SUBJECTIVE AND OBJECTIVE BOX
SINTIA CEDEÑO JR is a 67y Male s/p ROBOTIC ASSISTED RIGHT TOTAL KNEE ARTHROPLASTY      w/ h/o Glaucoma    HLD (hyperlipidemia)    Prediabetes    Secondary cataract of left eye, unspecified secondary cataract type    Hemorrhoids, unspecified hemorrhoid type    Anal fissure    Other constipation    Primary osteoarthritis of both knees    Abdominal hernia without obstruction and without gangrene, recurrence not specified, unspecified hernia type      denies any chest pain shortness of breath palpitation dizziness lightheadedness nausea vomiting fever or chills    S/P Hernia Surgery    Chronic glaucoma    H/O colonoscopy with polypectomy    H/O cataract extraction      Family history of prostate cancer (Father)    Essential hypertension (Father, Mother)    Family history of dementia (Mother)      SH: doesnot smoke or drink at this time    meloxicam (Urticaria)    acetaminophen     Tablet .. 975 milliGRAM(s) Oral every 8 hours  acetaminophen   IVPB .. 1000 milliGRAM(s) IV Intermittent once  ascorbic acid 500 milliGRAM(s) Oral two times a day  atorvastatin 10 milliGRAM(s) Oral at bedtime  benzocaine 15 mG/menthol 3.6 mG Lozenge 1 Lozenge Oral every 4 hours PRN  ceFAZolin   IVPB 2000 milliGRAM(s) IV Intermittent every 8 hours  HYDROmorphone  Injectable 0.5 milliGRAM(s) IV Push once  ketorolac   Injectable 30 milliGRAM(s) IV Push every 6 hours  losartan 100 milliGRAM(s) Oral daily  magnesium hydroxide Suspension 30 milliLiter(s) Oral daily PRN  melatonin 3 milliGRAM(s) Oral at bedtime PRN  multivitamin 1 Tablet(s) Oral daily  ondansetron Injectable 4 milliGRAM(s) IV Push every 6 hours PRN  oxyCODONE    IR 5 milliGRAM(s) Oral every 4 hours  oxyCODONE    IR 5 milliGRAM(s) Oral every 4 hours PRN  oxyCODONE    IR 10 milliGRAM(s) Oral every 4 hours PRN  pantoprazole    Tablet 40 milliGRAM(s) Oral before breakfast  senna 2 Tablet(s) Oral at bedtime  sodium chloride 0.9%. 1000 milliLiter(s) IV Continuous <Continuous>    T(C): 36.6 (03-30-22 @ 20:11), Max: 36.8 (03-30-22 @ 13:45)  HR: 76 (03-30-22 @ 20:11) (61 - 98)  BP: 131/71 (03-30-22 @ 20:11) (100/59 - 131/71)  RR: 18 (03-30-22 @ 20:11) (14 - 20)  SpO2: 95% (03-30-22 @ 20:11) (95% - 100%)  HEENT unremarkable  neck no JVD or bruit  heart normal S1 S2 RRR no gallops or rubs  chest clear to auscultation  abd sof nontender non distended +bs  ext no calf tenderness    A/P   DVT PX  pain control  bowel regimen   wound care as per ortho  GI PX  antiemetics prn  incentive spirometer

## 2022-03-30 NOTE — PHYSICAL THERAPY INITIAL EVALUATION ADULT - GAIT TRAINING, PT EVAL
Pt will be able to ambulate using assistive device up to 200 ft or more, be able to negotiate 2 flight of steps safely observing proper gait, posture and prevent falls.

## 2022-03-31 VITALS
DIASTOLIC BLOOD PRESSURE: 63 MMHG | HEART RATE: 88 BPM | TEMPERATURE: 98 F | OXYGEN SATURATION: 95 % | SYSTOLIC BLOOD PRESSURE: 120 MMHG | RESPIRATION RATE: 19 BRPM

## 2022-03-31 LAB
ANION GAP SERPL CALC-SCNC: 7 MMOL/L — SIGNIFICANT CHANGE UP (ref 5–17)
BUN SERPL-MCNC: 13 MG/DL — SIGNIFICANT CHANGE UP (ref 7–23)
CALCIUM SERPL-MCNC: 7.8 MG/DL — LOW (ref 8.5–10.1)
CHLORIDE SERPL-SCNC: 106 MMOL/L — SIGNIFICANT CHANGE UP (ref 96–108)
CO2 SERPL-SCNC: 26 MMOL/L — SIGNIFICANT CHANGE UP (ref 22–31)
CREAT SERPL-MCNC: 1.17 MG/DL — SIGNIFICANT CHANGE UP (ref 0.5–1.3)
EGFR: 69 ML/MIN/1.73M2 — SIGNIFICANT CHANGE UP
GLUCOSE SERPL-MCNC: 131 MG/DL — HIGH (ref 70–99)
HCT VFR BLD CALC: 32.7 % — LOW (ref 39–50)
HGB BLD-MCNC: 11.4 G/DL — LOW (ref 13–17)
MCHC RBC-ENTMCNC: 31.1 PG — SIGNIFICANT CHANGE UP (ref 27–34)
MCHC RBC-ENTMCNC: 34.9 G/DL — SIGNIFICANT CHANGE UP (ref 32–36)
MCV RBC AUTO: 89.1 FL — SIGNIFICANT CHANGE UP (ref 80–100)
NRBC # BLD: 0 /100 WBCS — SIGNIFICANT CHANGE UP (ref 0–0)
PLATELET # BLD AUTO: 161 K/UL — SIGNIFICANT CHANGE UP (ref 150–400)
POTASSIUM SERPL-MCNC: 3.7 MMOL/L — SIGNIFICANT CHANGE UP (ref 3.5–5.3)
POTASSIUM SERPL-SCNC: 3.7 MMOL/L — SIGNIFICANT CHANGE UP (ref 3.5–5.3)
RBC # BLD: 3.67 M/UL — LOW (ref 4.2–5.8)
RBC # FLD: 11.9 % — SIGNIFICANT CHANGE UP (ref 10.3–14.5)
SODIUM SERPL-SCNC: 139 MMOL/L — SIGNIFICANT CHANGE UP (ref 135–145)
WBC # BLD: 10.69 K/UL — HIGH (ref 3.8–10.5)
WBC # FLD AUTO: 10.69 K/UL — HIGH (ref 3.8–10.5)

## 2022-03-31 RX ORDER — SENNA PLUS 8.6 MG/1
2 TABLET ORAL
Qty: 0 | Refills: 0 | DISCHARGE
Start: 2022-03-31

## 2022-03-31 RX ORDER — ASPIRIN/CALCIUM CARB/MAGNESIUM 324 MG
1 TABLET ORAL
Qty: 60 | Refills: 0
Start: 2022-03-31 | End: 2022-04-29

## 2022-03-31 RX ORDER — CELECOXIB 200 MG/1
1 CAPSULE ORAL
Qty: 60 | Refills: 0
Start: 2022-03-31 | End: 2022-04-29

## 2022-03-31 RX ORDER — OXYCODONE HYDROCHLORIDE 5 MG/1
2 TABLET ORAL
Qty: 50 | Refills: 0
Start: 2022-03-31 | End: 2022-04-04

## 2022-03-31 RX ORDER — ACETAMINOPHEN 500 MG
3 TABLET ORAL
Qty: 0 | Refills: 0 | DISCHARGE
Start: 2022-03-31

## 2022-03-31 RX ORDER — SODIUM CHLORIDE 9 MG/ML
1000 INJECTION INTRAMUSCULAR; INTRAVENOUS; SUBCUTANEOUS ONCE
Refills: 0 | Status: COMPLETED | OUTPATIENT
Start: 2022-03-31 | End: 2022-03-31

## 2022-03-31 RX ORDER — ASCORBIC ACID 60 MG
1 TABLET,CHEWABLE ORAL
Qty: 0 | Refills: 0 | DISCHARGE
Start: 2022-03-31

## 2022-03-31 RX ADMIN — Medication 1 TABLET(S): at 09:45

## 2022-03-31 RX ADMIN — Medication 975 MILLIGRAM(S): at 05:52

## 2022-03-31 RX ADMIN — Medication 500 MILLIGRAM(S): at 05:53

## 2022-03-31 RX ADMIN — Medication 102 MILLIGRAM(S): at 05:55

## 2022-03-31 RX ADMIN — Medication 81 MILLIGRAM(S): at 05:52

## 2022-03-31 RX ADMIN — CELECOXIB 200 MILLIGRAM(S): 200 CAPSULE ORAL at 05:53

## 2022-03-31 RX ADMIN — OXYCODONE HYDROCHLORIDE 5 MILLIGRAM(S): 5 TABLET ORAL at 10:45

## 2022-03-31 RX ADMIN — SODIUM CHLORIDE 500 MILLILITER(S): 9 INJECTION INTRAMUSCULAR; INTRAVENOUS; SUBCUTANEOUS at 07:24

## 2022-03-31 RX ADMIN — Medication 30 MILLIGRAM(S): at 07:50

## 2022-03-31 RX ADMIN — Medication 100 MILLIGRAM(S): at 01:42

## 2022-03-31 RX ADMIN — ATORVASTATIN CALCIUM 10 MILLIGRAM(S): 80 TABLET, FILM COATED ORAL at 05:53

## 2022-03-31 RX ADMIN — PANTOPRAZOLE SODIUM 40 MILLIGRAM(S): 20 TABLET, DELAYED RELEASE ORAL at 05:52

## 2022-03-31 RX ADMIN — LOSARTAN POTASSIUM 100 MILLIGRAM(S): 100 TABLET, FILM COATED ORAL at 05:56

## 2022-03-31 RX ADMIN — Medication 30 MILLIGRAM(S): at 07:37

## 2022-03-31 RX ADMIN — CELECOXIB 200 MILLIGRAM(S): 200 CAPSULE ORAL at 06:52

## 2022-03-31 RX ADMIN — Medication 975 MILLIGRAM(S): at 06:52

## 2022-03-31 RX ADMIN — OXYCODONE HYDROCHLORIDE 5 MILLIGRAM(S): 5 TABLET ORAL at 09:45

## 2022-03-31 RX ADMIN — Medication 30 MILLIGRAM(S): at 01:35

## 2022-03-31 NOTE — PROGRESS NOTE ADULT - SUBJECTIVE AND OBJECTIVE BOX
Patient is seen and examined at bedside. Denies CP/SOB/Dizziness/N/V/D/HA. Pain is controlled.     Vital Signs Last 24 Hrs  T(C): 36.7 (31 Mar 2022 07:55), Max: 36.8 (30 Mar 2022 13:45)  T(F): 98 (31 Mar 2022 07:55), Max: 98.3 (30 Mar 2022 13:45)  HR: 81 (31 Mar 2022 07:55) (61 - 90)  BP: 133/71 (31 Mar 2022 07:55) (100/59 - 133/71)  BP(mean): --  RR: 19 (31 Mar 2022 07:55) (14 - 20)  SpO2: 98% (31 Mar 2022 07:55) (95% - 100%)      PHYSICAL EXAM:  General: NAD, WDWN.   Neuro:  Alert & responsive  HEENT: NCAT, EOMI, conjunctiva clear  abd: soft, NT/ND  Right LE: Prineo dressing C/D/I. Motor intact + EHL/FHL/TA/GS.  Sensation is grossly intact.  Extremity warm, compartments soft, compressible. No calf tenderness. DP 2+   Left LE: Motor intact +EHL/FHL/TA/GS. Sensation is grossly intact. Extremity warm, compartments soft, compressible. No calf tenderness. DP2+    Labs:                          11.4   10.69 )-----------( 161      ( 31 Mar 2022 06:37 )             32.7       03-31    139  |  106  |  13  ----------------------------<  131<H>  3.7   |  26  |  1.17    Ca    7.8<L>      31 Mar 2022 06:37        A/P: Patient is a 67y y/o Male s/p right TKA, POD # 1  -wound care, knee extension/leg elevation, cryocuff, isometric exercises, new medications reviewed with pt  -Pain control/analgesia reviewed   -Inc spirometry reviewed with pt, demonstrated competence  -DVT prophylaxis with Venodynes/Aspirin 81mg BID  -PT/OT/WBAT  -medical consult reviewed   -DC planning: home today  -Pt seen in am with Dr Ortiz     
Patient is 67y y/o Male s/p R TKA POD#0  Patient is seen and examined at bedside.   Pt tolerated procedure well without any intra-op complications.    Pain is controlled.  Denies CP/SOB/Dizziness/N/V/D/HA.     Vital Signs Last 24 Hrs  T(C): 36.6 (30 Mar 2022 15:45), Max: 36.8 (30 Mar 2022 13:45)  T(F): 97.8 (30 Mar 2022 15:45), Max: 98.3 (30 Mar 2022 13:45)  HR: 89 (30 Mar 2022 15:45) (61 - 98)  BP: 128/73 (30 Mar 2022 15:45) (100/59 - 129/79)  BP(mean): --  RR: 16 (30 Mar 2022 15:45) (14 - 20)  SpO2: 97% (30 Mar 2022 15:45) (97% - 100%)      PHYSICAL EXAM:  General: A&Ox3 NAD  RLE: Dressing C/D/I with ACE wrap in place. Motor intact + EHL/FHL/TA/GS.  Sensation is grossly intact.  Extremity warm, compartments soft, compressible. No calf tenderness. DP 2+   LLE: Motor intact +EHL/FHL/TA/GS. Sensation is grossly intact. Extremity warm, compartments soft, compressible. No calf tenderness. DP2+    Labs:                          13.0   4.97  )-----------( 153      ( 30 Mar 2022 11:00 )             37.0       03-30    140  |  108  |  13  ----------------------------<  144<H>  4.0   |  28  |  1.18    Ca    8.5      30 Mar 2022 11:00        A/P: Patient is a 67y y/o Male s/p R TKA, POD # 0  -wound care, knee extension/leg elevation, cryocuff, isometric exercises, new medications reviewed with pt  -Pain control/analgesia  -Inc spirometry reviewed with pt, demonstrated competence  -DVT prophylaxis with Venodynes/Aspirin 81 BID  -F/U AM Labs  -PT/OT/WBAT  -prophylactic Antibiotic  -medical consult  -DC planning:     
Patient is seen and examined at bedside. Denies CP/SOB/Dizziness/N/V/D/HA. Pain is controlled.     Vital Signs Last 24 Hrs  T(C): 36.7 (31 Mar 2022 07:55), Max: 36.8 (30 Mar 2022 13:45)  T(F): 98 (31 Mar 2022 07:55), Max: 98.3 (30 Mar 2022 13:45)  HR: 81 (31 Mar 2022 07:55) (61 - 90)  BP: 118/72 (31 Mar 2022 10:00) (101/61 - 133/71)  BP(mean): --  RR: 19 (31 Mar 2022 07:55) (16 - 20)  SpO2: 99% (31 Mar 2022 10:00) (95% - 100%)      PHYSICAL EXAM:  General: NAD, WDWN.   Neuro:  Alert & responsive  HEENT: NCAT, EOMI, conjunctiva clear  abd: soft, NT/ND   Right LE: Prineo dressing C/D/I. Motor intact + EHL/FHL/TA/GS.  Sensation is grossly intact.  Extremity warm, compartments soft, compressible. No calf tenderness. DP 2+   Left LE: Motor intact +EHL/FHL/TA/GS. Sensation is grossly intact. Extremity warm, compartments soft, compressible. No calf tenderness. DP2+    Labs:                          11.4   10.69 )-----------( 161      ( 31 Mar 2022 06:37 )             32.7       03-31    139  |  106  |  13  ----------------------------<  131<H>  3.7   |  26  |  1.17    Ca    7.8<L>      31 Mar 2022 06:37        A/P: Patient is a 67y y/o Male s/p right TKA, POD # 1  -wound care, knee extension/leg elevation, cryocuff, isometric exercises, new medications reviewed with pt  -Pain control/analgesia discussed   -Inc spirometry reviewed with pt, demonstrated competence  -DVT prophylaxis with Venodynes/Aspirin  -PT/OT/WBAT  -medical consult reviewed   -Pt seen in am with Dr Diana LOWE plan: home today  -RUI planning:     
SINTIA CEDEÑO JR is a 67y Male s/p ROBOTIC ASSISTED RIGHT TOTAL KNEE ARTHROPLASTY        denies any chest pain shortness of breath palpitation dizziness lightheadedness nausea vomiting fever or chills    T(C): 36.7 (03-31-22 @ 12:25), Max: 36.8 (03-30-22 @ 13:45)  HR: 88 (03-31-22 @ 12:25) (72 - 90)  BP: 120/63 (03-31-22 @ 12:25) (101/61 - 133/71)  RR: 19 (03-31-22 @ 12:25) (16 - 19)  SpO2: 95% (03-31-22 @ 12:25) (95% - 99%)  no jvd/bruit  s1 s2 rrr  cta  s/nt/nd  no calf tend                        11.4   10.69 )-----------( 161      ( 31 Mar 2022 06:37 )             32.7   03-31    139  |  106  |  13  ----------------------------<  131<H>  3.7   |  26  |  1.17    Ca    7.8<L>      31 Mar 2022 06:37        cont dvt px  pain control  bowel regimen  antiemetics  incentive spirometer

## 2022-04-01 ENCOUNTER — NON-APPOINTMENT (OUTPATIENT)
Age: 67
End: 2022-04-01

## 2022-04-01 LAB — SURGICAL PATHOLOGY STUDY: SIGNIFICANT CHANGE UP

## 2022-04-02 DIAGNOSIS — E78.5 HYPERLIPIDEMIA, UNSPECIFIED: ICD-10-CM

## 2022-04-02 DIAGNOSIS — I10 ESSENTIAL (PRIMARY) HYPERTENSION: ICD-10-CM

## 2022-04-02 DIAGNOSIS — H26.40 UNSPECIFIED SECONDARY CATARACT: ICD-10-CM

## 2022-04-02 DIAGNOSIS — M17.11 UNILATERAL PRIMARY OSTEOARTHRITIS, RIGHT KNEE: ICD-10-CM

## 2022-04-02 DIAGNOSIS — Z80.42 FAMILY HISTORY OF MALIGNANT NEOPLASM OF PROSTATE: ICD-10-CM

## 2022-04-04 ENCOUNTER — NON-APPOINTMENT (OUTPATIENT)
Age: 67
End: 2022-04-04

## 2022-04-09 ENCOUNTER — NON-APPOINTMENT (OUTPATIENT)
Age: 67
End: 2022-04-09

## 2022-04-09 RX ORDER — CHLORTHALIDONE 25 MG/1
25 TABLET ORAL
Qty: 90 | Refills: 3 | Status: DISCONTINUED | COMMUNITY
Start: 2019-11-01 | End: 2022-04-09

## 2022-04-11 ENCOUNTER — APPOINTMENT (OUTPATIENT)
Dept: ORTHOPEDIC SURGERY | Facility: CLINIC | Age: 67
End: 2022-04-11
Payer: COMMERCIAL

## 2022-04-11 VITALS — WEIGHT: 225 LBS | HEIGHT: 73 IN | BODY MASS INDEX: 29.82 KG/M2

## 2022-04-11 PROBLEM — Z11.59 SCREENING FOR VIRAL DISEASE: Status: RESOLVED | Noted: 2020-09-29 | Resolved: 2021-08-31

## 2022-04-11 PROCEDURE — 73562 X-RAY EXAM OF KNEE 3: CPT | Mod: RT

## 2022-04-11 PROCEDURE — 99024 POSTOP FOLLOW-UP VISIT: CPT

## 2022-04-11 NOTE — ASSESSMENT
[FreeTextEntry1] : 1/4/22: Bilateral R>L advanced knee OA, varus deformity. He has pain daily, especially with activity. Has done CSI and HA series in the past. Discussed TKA and r/b/a in detail. Discussed recovery of doing one TKA at a time vs. bilateral TKA. Advised against traveling for 8 wks after surgery as it increases risk of blood clots. Will obtain CT of bilateral knees for presurgical evaluation and to evaluate for bone loss. Patient would like to proceed with R TKA in 03/2022. f/up prior to surgery\par \par 4/11/22: 12 days postop with minimal pain. Still has some stiffness as he flexes to 85 on exam. Advised of importance of PT and HEP.  FU 4 weeks

## 2022-04-11 NOTE — HISTORY OF PRESENT ILLNESS
[0] : 0 [Occasional] : occasional [Walking/activity] : walking/activity [Sitting] : sitting [] : Post Surgical Visit: yes [de-identified] : 4/11/22: 12 days s/p R TKA doing well with minimal pain. Denies fevers/chills. \par \par Prev doc:\par 1/4/22: 65 yo M with longstanding bilateral knee pain for many years with no specific injury. He has done HA series ~2-3yrs ago to some benefit. Wears compression brace to some benefit. [FreeTextEntry6] : no pain just stiffness [de-identified] : jackie [de-identified] : 3/30/22 [de-identified] : currently [de-identified] : xray/ct [de-identified] : 3/30/22 [de-identified] : physical therapy [de-identified] : Right TKA

## 2022-04-11 NOTE — PHYSICAL EXAM
[Right] : right knee [FreeTextEntry3] : well healed incisions [FreeTextEntry9] : flexion:85 [All Views] : anteroposterior, lateral, skyline, and anteroposterior standing [Components well fixed, in good position] : Components well fixed, in good position

## 2022-04-12 LAB
ANION GAP SERPL CALC-SCNC: 14 MMOL/L
BUN SERPL-MCNC: 18 MG/DL
CALCIUM SERPL-MCNC: 9.6 MG/DL
CHLORIDE SERPL-SCNC: 102 MMOL/L
CO2 SERPL-SCNC: 24 MMOL/L
CREAT SERPL-MCNC: 1.14 MG/DL
EGFR: 71 ML/MIN/1.73M2
GLUCOSE SERPL-MCNC: 125 MG/DL
POTASSIUM SERPL-SCNC: 3.7 MMOL/L
SODIUM SERPL-SCNC: 140 MMOL/L

## 2022-04-12 RX ORDER — TRIAMTERENE 50 MG/1
50 CAPSULE ORAL DAILY
Qty: 30 | Refills: 1 | Status: DISCONTINUED | COMMUNITY
Start: 2022-04-09 | End: 2022-04-12

## 2022-04-14 ENCOUNTER — APPOINTMENT (OUTPATIENT)
Dept: CARDIOLOGY | Facility: CLINIC | Age: 67
End: 2022-04-14
Payer: COMMERCIAL

## 2022-04-14 ENCOUNTER — NON-APPOINTMENT (OUTPATIENT)
Age: 67
End: 2022-04-14

## 2022-04-14 VITALS
HEART RATE: 88 BPM | OXYGEN SATURATION: 98 % | BODY MASS INDEX: 29.82 KG/M2 | WEIGHT: 225 LBS | TEMPERATURE: 97.3 F | HEIGHT: 73 IN

## 2022-04-14 VITALS — SYSTOLIC BLOOD PRESSURE: 130 MMHG | DIASTOLIC BLOOD PRESSURE: 65 MMHG

## 2022-04-14 PROCEDURE — 99214 OFFICE O/P EST MOD 30 MIN: CPT

## 2022-04-14 PROCEDURE — 93000 ELECTROCARDIOGRAM COMPLETE: CPT

## 2022-04-14 RX ORDER — CHLORTHALIDONE 25 MG/1
25 TABLET ORAL DAILY
Refills: 0 | Status: DISCONTINUED | COMMUNITY
End: 2022-04-14

## 2022-04-14 RX ORDER — DICLOFENAC SODIUM 10 MG/G
1 GEL TOPICAL DAILY
Qty: 1 | Refills: 3 | Status: DISCONTINUED | COMMUNITY
Start: 2018-01-29 | End: 2022-04-14

## 2022-04-14 RX ORDER — TADALAFIL 5 MG/1
5 TABLET, FILM COATED ORAL
Qty: 6 | Refills: 5 | Status: DISCONTINUED | COMMUNITY
Start: 2018-10-10 | End: 2022-04-14

## 2022-04-16 NOTE — PHYSICAL EXAM
[Well Developed] : well developed [Well Nourished] : well nourished [Normal Conjunctiva] : normal conjunctiva [Normal Venous Pressure] : normal venous pressure [No Carotid Bruit] : no carotid bruit [Normal S1, S2] : normal S1, S2 [No Rub] : no rub [Clear Lung Fields] : clear lung fields [Good Air Entry] : good air entry [Soft] : abdomen soft [Non Tender] : non-tender [Normal Gait] : normal gait [No Edema] : no edema [No Rash] : no rash [Moves all extremities] : moves all extremities [Alert and Oriented] : alert and oriented [Normal memory] : normal memory

## 2022-04-16 NOTE — ASSESSMENT
[FreeTextEntry1] : Patient is a status post right knee replacement.\par He has lost a couple pounds and would like to start exercising when his knee allows him to do so.\par His blood pressure is to goal.\par I have changed him to Aldactone and may need to increase his dose as tolerated.  I advised him to decrease potassium to 2 a day for now.\par Patient with hypokalemia secondary to diuretic use.\par BMP to be done next week.\par Lipid panel is to goal.\par Patient is not diabetic.\par As long as asymptomatic I will see him in the office in about 6 months otherwise he will call to make an appointment earlier.\par \par

## 2022-04-16 NOTE — REVIEW OF SYSTEMS
[Headache] : no headache [Weight Gain (___ Lbs)] : no recent weight gain [Feeling Fatigued] : not feeling fatigued [Blurry Vision] : no blurred vision [Seeing Double (Diplopia)] : no diplopia [Earache] : no earache [Discharge From Ears] : no discharge from the ears [Hearing Loss] : no hearing loss [Leg Claudication] : no intermittent leg claudication [Palpitations] : no palpitations [Syncope] : no syncope [Cough] : no cough [Wheezing] : no wheezing [Nausea] : no nausea [Change In The Stool] : no change in stool [Urinary Frequency] : no change in urinary frequency [Erectile Dysfunction] : no erectile dysfunction [Joint Pain] : joint pain [Dizziness] : no dizziness [Convulsions] : no convulsions [Confusion] : no confusion was observed [Under Stress] : not under stress [Easy Bleeding] : a tendency for easy bleeding

## 2022-04-16 NOTE — HISTORY OF PRESENT ILLNESS
[FreeTextEntry1] : 67-year-old old male who is here for evaluation due to hypertension hyperlipidemia hypokalemia and being overweight.\par Patient is now recuperating status post right total knee replacement on 3/30/2022.\par Denies having any chest pain or shortness of breath.  No syncope or presyncope.  Has difficulty climbing the stairs with his knee but is getting better.\par Patient has hypokalemia, discontinued chlorthalidone and is started Aldactone.\par EKG with sinus rhythm, heart rate 84 bpm with left atrial enlargement, no ST-T changes.

## 2022-04-21 LAB
ANION GAP SERPL CALC-SCNC: 11 MMOL/L
BUN SERPL-MCNC: 14 MG/DL
CALCIUM SERPL-MCNC: 9.6 MG/DL
CHLORIDE SERPL-SCNC: 103 MMOL/L
CO2 SERPL-SCNC: 25 MMOL/L
CREAT SERPL-MCNC: 1.15 MG/DL
EGFR: 71 ML/MIN/1.73M2
GLUCOSE SERPL-MCNC: 107 MG/DL
POTASSIUM SERPL-SCNC: 4.4 MMOL/L
SODIUM SERPL-SCNC: 138 MMOL/L

## 2022-04-27 ENCOUNTER — APPOINTMENT (OUTPATIENT)
Dept: INTERNAL MEDICINE | Facility: CLINIC | Age: 67
End: 2022-04-27

## 2022-05-23 ENCOUNTER — APPOINTMENT (OUTPATIENT)
Dept: ORTHOPEDIC SURGERY | Facility: CLINIC | Age: 67
End: 2022-05-23
Payer: COMMERCIAL

## 2022-05-23 VITALS — WEIGHT: 223 LBS | BODY MASS INDEX: 29.55 KG/M2 | HEIGHT: 73 IN

## 2022-05-23 PROCEDURE — 73562 X-RAY EXAM OF KNEE 3: CPT | Mod: RT

## 2022-05-23 PROCEDURE — 99024 POSTOP FOLLOW-UP VISIT: CPT

## 2022-05-23 NOTE — PHYSICAL EXAM
[de-identified] : Effected side: right \par Incision is clean and dry with no drainage\par Sensation intact\par no edema LE\par Decreased ROM: 2-110 \par \par Xray 3 views knee - Implants good position and well fixed - no fracture or dislocation\par

## 2022-05-23 NOTE — HISTORY OF PRESENT ILLNESS
[Result of repetitive motion] : result of repetitive motion [2] : 2 [1] : 2 [Dull/Aching] : dull/aching [Occasional] : occasional [] : no [de-identified] : 3.30.22 [de-identified] : rt tka [de-identified] : 5/23/22: 7 weeks s/p R TKA doing well still has minimal pain. Doing slightly better with PT\par \par Prev doc:\par 1/4/22: 65 yo M with longstanding bilateral knee pain for many years with no specific injury. He has done HA series ~2-3yrs ago to some benefit. Wears compression brace to some benefit.\par 4/11/22: 12 days s/p R TKA doing well with minimal pain. Denies fevers/chills.

## 2022-05-23 NOTE — ASSESSMENT
[FreeTextEntry1] : 1/4/22: Bilateral R>L advanced knee OA, varus deformity. He has pain daily, especially with activity. Has done CSI and HA series in the past. Discussed TKA and r/b/a in detail. Discussed recovery of doing one TKA at a time vs. bilateral TKA. Advised against traveling for 8 wks after surgery as it increases risk of blood clots. Will obtain CT of bilateral knees for presurgical evaluation and to evaluate for bone loss. Patient would like to proceed with R TKA in 03/2022. f/up prior to surgery\par 4/11/22: 12 days postop with minimal pain. Still has some stiffness as he flexes to 85 on exam. Advised of importance of PT and HEP.  FU 4 weeks\par \par 5/23/22: 7 weeks postop with minimal pain. Continue with PT to work on ROM

## 2022-05-25 ENCOUNTER — NON-APPOINTMENT (OUTPATIENT)
Age: 67
End: 2022-05-25

## 2022-05-31 RX ORDER — ATORVASTATIN CALCIUM 10 MG/1
10 TABLET, FILM COATED ORAL DAILY
Qty: 90 | Refills: 3 | Status: ACTIVE | COMMUNITY
Start: 2019-11-01 | End: 1900-01-01

## 2022-06-16 NOTE — H&P PST ADULT - BP NONINVASIVE SYSTOLIC (MM HG)
Gurvinder Clarke  1960  [facility] for set up and instruction of home sleep study with the [facility] unit.  she was instructed on proper set-up and operation of HST unit. Written instructions with set up diagram given for reference and reinforcement of verbal instruction and demonstration. she was able to return demonstration appropriately. No home environment, vision, dexterity, comprehension concerns with this patient based on completed forms and patient interactions. Patient will return unit after 2 nights as instructed.     Electronically signed by Carlitos Heredia on 6/6/2022 at 11:14 AM
Results for the most recent sleep study on Shwetha Singh  1960 are finalized and available. Please see media tab.     Electronically signed by Franca Shannon on 6/16/2022 at 2:55 AM
152

## 2022-06-27 ENCOUNTER — APPOINTMENT (OUTPATIENT)
Dept: ORTHOPEDIC SURGERY | Facility: CLINIC | Age: 67
End: 2022-06-27
Payer: COMMERCIAL

## 2022-06-27 VITALS — HEIGHT: 73 IN | WEIGHT: 223 LBS | BODY MASS INDEX: 29.55 KG/M2

## 2022-06-27 PROCEDURE — 99024 POSTOP FOLLOW-UP VISIT: CPT

## 2022-06-27 NOTE — PHYSICAL EXAM
[de-identified] : Effected side: right \par Incision is clean and dry with no drainage\par Sensation intact\par no edema LE\par Decreased ROM: 3-105passive 115active\par \par \par

## 2022-06-27 NOTE — ASSESSMENT
[FreeTextEntry1] : 1/4/22: Bilateral R>L advanced knee OA, varus deformity. He has pain daily, especially with activity. Has done CSI and HA series in the past. Discussed TKA and r/b/a in detail. Discussed recovery of doing one TKA at a time vs. bilateral TKA. Advised against traveling for 8 wks after surgery as it increases risk of blood clots. Will obtain CT of bilateral knees for presurgical evaluation and to evaluate for bone loss. Patient would like to proceed with R TKA in 03/2022. f/up prior to surgery\par 4/11/22: 12 days postop with minimal pain. Still has some stiffness as he flexes to 85 on exam. Advised of importance of PT and HEP.  FU 4 weeks\par 5/23/22: 7 weeks postop with minimal pain. Continue with PT to work on ROM \par \par 6/27/22: 12 weeks postop doing well with some stiffness however ROM preop was 85 and now [phone] in office. He has been gradually returning to normal activity. Finish out PT and transition to HEP. Follow up 1 year postop

## 2022-06-27 NOTE — DISCUSSION/SUMMARY
[de-identified] : The incision was inspected and was clean and dry with no drainage.  The patient was instructed to call for fevers, chills, wound drainage, wound opening, redness, or any other concerns.\par

## 2022-06-27 NOTE — HISTORY OF PRESENT ILLNESS
[Result of repetitive motion] : result of repetitive motion [2] : 2 [Throbbing] : throbbing [Tightness] : tightness [Occasional] : occasional [] : Post Surgical Visit: yes [de-identified] : 6/27/22: 12 weeks s/p R TKA doing well with some stiffness and mild pain. Doing well with PT\par \par Prev doc:\par 1/4/22: 65 yo M with longstanding bilateral knee pain for many years with no specific injury. He has done HA series ~2-3yrs ago to some benefit. Wears compression brace to some benefit.\par 4/11/22: 12 days s/p R TKA doing well with minimal pain. Denies fevers/chills. \par 5/23/22: 7 weeks s/p R TKA doing well still has minimal pain. Doing slightly better with PT [de-identified] : 3.30.22 [de-identified] : TKA Rt Knee

## 2022-07-28 ENCOUNTER — NON-APPOINTMENT (OUTPATIENT)
Age: 67
End: 2022-07-28

## 2022-08-18 ENCOUNTER — APPOINTMENT (OUTPATIENT)
Dept: GASTROENTEROLOGY | Facility: CLINIC | Age: 67
End: 2022-08-18

## 2022-08-18 VITALS
DIASTOLIC BLOOD PRESSURE: 76 MMHG | HEART RATE: 98 BPM | TEMPERATURE: 97.5 F | RESPIRATION RATE: 14 BRPM | OXYGEN SATURATION: 98 % | WEIGHT: 232 LBS | BODY MASS INDEX: 30.75 KG/M2 | SYSTOLIC BLOOD PRESSURE: 130 MMHG | HEIGHT: 73 IN

## 2022-08-18 DIAGNOSIS — Z86.010 PERSONAL HISTORY OF COLONIC POLYPS: ICD-10-CM

## 2022-08-18 PROCEDURE — 99204 OFFICE O/P NEW MOD 45 MIN: CPT

## 2022-08-18 RX ORDER — PSYLLIUM SEED (WITH DEXTROSE)
POWDER (GRAM) ORAL
Refills: 0 | Status: ACTIVE | COMMUNITY

## 2022-08-18 NOTE — ASSESSMENT
[FreeTextEntry1] : I discussed with the patient to be appropriate to do a colonoscopy given his prior history of colon polyps and has been 5 years since his last procedure.  He has not had blood work done recently so routine blood work including CBC and CMP were ordered as well as preop COVID testing.  Clenpiq was ordered as a preparation.  The indication benefits risk and alternatives were discussed with the patient and he is medically optimal for the planned procedure.\par \par In addition patient has new onset GERD and is 67 years old and should have an evaluation for causes of the symptoms more significant and just acid reflux.  I reviewed the indications benefits risks alternatives to this procedure as well and the patient has agreed to have both performed at the same time.  In the interim he will continue his omeprazole.

## 2022-08-18 NOTE — HISTORY OF PRESENT ILLNESS
[de-identified] : Patient with a prior history of colon polyps.  His last colonoscopy was 5 years ago.  Patient is asymptomatic without abdominal pain or rectal bleeding.  He occasionally has some rectal irritation had a fissure in the past but mostly this occurs in the summer relieved by some sort of cream.  Its more annoying than pain.  There is no bleeding associated with it.  He has no family history.\par \par Patient also here to his diagnosed with GERD and had seen ENT doctor but was primarily complaining of heartburn.  He takes omeprazole which seems to help but he is never had an endoscopy.  He has no dysphagia odynophagia nausea vomiting or weight loss.

## 2022-10-07 ENCOUNTER — APPOINTMENT (OUTPATIENT)
Dept: INTERNAL MEDICINE | Facility: CLINIC | Age: 67
End: 2022-10-07

## 2022-10-07 VITALS
HEART RATE: 68 BPM | SYSTOLIC BLOOD PRESSURE: 130 MMHG | RESPIRATION RATE: 13 BRPM | BODY MASS INDEX: 30.75 KG/M2 | WEIGHT: 232 LBS | HEIGHT: 73 IN | DIASTOLIC BLOOD PRESSURE: 78 MMHG | OXYGEN SATURATION: 97 %

## 2022-10-07 DIAGNOSIS — S81.819A LACERATION W/OUT FOREIGN BODY, UNSPECIFIED LOWER LEG, INITIAL ENCOUNTER: ICD-10-CM

## 2022-10-07 DIAGNOSIS — T14.8XXA OTHER INJURY OF UNSPECIFIED BODY REGION, INITIAL ENCOUNTER: ICD-10-CM

## 2022-10-07 PROCEDURE — 99214 OFFICE O/P EST MOD 30 MIN: CPT

## 2022-10-07 RX ORDER — SODIUM PICOSULFATE, MAGNESIUM OXIDE, AND ANHYDROUS CITRIC ACID 10; 3.5; 12 MG/160ML; G/160ML; G/160ML
10-3.5-12 MG-GM LIQUID ORAL
Qty: 1 | Refills: 0 | Status: DISCONTINUED | COMMUNITY
Start: 2022-08-18 | End: 2022-10-07

## 2022-10-09 NOTE — PHYSICAL EXAM
[No Acute Distress] : no acute distress [No Respiratory Distress] : no respiratory distress  [Clear to Auscultation] : lungs were clear to auscultation bilaterally [Normal Rate] : normal rate  [Regular Rhythm] : with a regular rhythm [Normal Affect] : the affect was normal [Normal Mood] : the mood was normal [de-identified] : right lower leg with superficial wound/lac, + mild surrounding erythema without warmth, skin appears wet without significant granulation/scabbing, approximately size of a nickel

## 2022-10-09 NOTE — HISTORY OF PRESENT ILLNESS
[FreeTextEntry8] : Patient presents stating he hit his right lower leg on a ladder about one week ago, has been applying Neosporin and putting a Band-Aid daily. Patient is now questioning if it is infected

## 2022-10-09 NOTE — ASSESSMENT
[FreeTextEntry1] : TDAP=UTD. Bactrim x5 days given. Patient told to leave open to the air to dry out. Patient will report any issues and return to the office as scheduled for regular followup\par \par Dr. Dueñas was present in office building while I examined patient\par

## 2022-10-12 ENCOUNTER — APPOINTMENT (OUTPATIENT)
Dept: INTERNAL MEDICINE | Facility: CLINIC | Age: 67
End: 2022-10-12

## 2022-10-12 DIAGNOSIS — Z23 ENCOUNTER FOR IMMUNIZATION: ICD-10-CM

## 2022-10-12 PROCEDURE — 90662 IIV NO PRSV INCREASED AG IM: CPT

## 2022-10-12 PROCEDURE — G0008: CPT

## 2022-10-13 PROBLEM — Z13.31 SCREENING FOR DEPRESSION: Status: ACTIVE | Noted: 2021-12-20

## 2022-10-21 ENCOUNTER — NON-APPOINTMENT (OUTPATIENT)
Age: 67
End: 2022-10-21

## 2022-10-22 ENCOUNTER — NON-APPOINTMENT (OUTPATIENT)
Age: 67
End: 2022-10-22

## 2022-10-24 ENCOUNTER — TRANSCRIPTION ENCOUNTER (OUTPATIENT)
Age: 67
End: 2022-10-24

## 2022-10-24 LAB
ALBUMIN SERPL ELPH-MCNC: 5 G/DL
ALP BLD-CCNC: 86 U/L
ALT SERPL-CCNC: 17 U/L
ANION GAP SERPL CALC-SCNC: 11 MMOL/L
AST SERPL-CCNC: 28 U/L
BASOPHILS # BLD AUTO: 0.02 K/UL
BASOPHILS NFR BLD AUTO: 0.5 %
BILIRUB SERPL-MCNC: 0.7 MG/DL
BUN SERPL-MCNC: 10 MG/DL
CALCIUM SERPL-MCNC: 9.4 MG/DL
CHLORIDE SERPL-SCNC: 103 MMOL/L
CO2 SERPL-SCNC: 24 MMOL/L
CREAT SERPL-MCNC: 1.15 MG/DL
EGFR: 71 ML/MIN/1.73M2
EOSINOPHIL # BLD AUTO: 0.12 K/UL
EOSINOPHIL NFR BLD AUTO: 3 %
GLUCOSE SERPL-MCNC: 110 MG/DL
HCT VFR BLD CALC: 39.8 %
HGB BLD-MCNC: 13.3 G/DL
IMM GRANULOCYTES NFR BLD AUTO: 0.5 %
LYMPHOCYTES # BLD AUTO: 1.24 K/UL
LYMPHOCYTES NFR BLD AUTO: 31 %
MAN DIFF?: NORMAL
MCHC RBC-ENTMCNC: 30.7 PG
MCHC RBC-ENTMCNC: 33.4 GM/DL
MCV RBC AUTO: 91.9 FL
MONOCYTES # BLD AUTO: 0.52 K/UL
MONOCYTES NFR BLD AUTO: 13 %
NEUTROPHILS # BLD AUTO: 2.08 K/UL
NEUTROPHILS NFR BLD AUTO: 52 %
PLATELET # BLD AUTO: 224 K/UL
POTASSIUM SERPL-SCNC: 4.2 MMOL/L
PROT SERPL-MCNC: 7.5 G/DL
RBC # BLD: 4.33 M/UL
RBC # FLD: 11.9 %
SARS-COV-2 N GENE NPH QL NAA+PROBE: NOT DETECTED
SODIUM SERPL-SCNC: 139 MMOL/L
WBC # FLD AUTO: 4 K/UL

## 2022-10-25 ENCOUNTER — APPOINTMENT (OUTPATIENT)
Dept: GASTROENTEROLOGY | Facility: GI CENTER | Age: 67
End: 2022-10-25

## 2022-10-25 ENCOUNTER — OUTPATIENT (OUTPATIENT)
Dept: OUTPATIENT SERVICES | Facility: HOSPITAL | Age: 67
LOS: 1 days | End: 2022-10-25
Payer: COMMERCIAL

## 2022-10-25 ENCOUNTER — RESULT REVIEW (OUTPATIENT)
Age: 67
End: 2022-10-25

## 2022-10-25 DIAGNOSIS — Z98.49 CATARACT EXTRACTION STATUS, UNSPECIFIED EYE: Chronic | ICD-10-CM

## 2022-10-25 DIAGNOSIS — Z86.010 PERSONAL HISTORY OF COLONIC POLYPS: ICD-10-CM

## 2022-10-25 DIAGNOSIS — H40.11X0 PRIMARY OPEN-ANGLE GLAUCOMA, STAGE UNSPECIFIED: Chronic | ICD-10-CM

## 2022-10-25 DIAGNOSIS — K21.9 GASTRO-ESOPHAGEAL REFLUX DISEASE WITHOUT ESOPHAGITIS: ICD-10-CM

## 2022-10-25 DIAGNOSIS — Z98.890 OTHER SPECIFIED POSTPROCEDURAL STATES: Chronic | ICD-10-CM

## 2022-10-25 PROCEDURE — 88305 TISSUE EXAM BY PATHOLOGIST: CPT

## 2022-10-25 PROCEDURE — 43235 EGD DIAGNOSTIC BRUSH WASH: CPT | Mod: 59

## 2022-10-25 PROCEDURE — 88305 TISSUE EXAM BY PATHOLOGIST: CPT | Mod: 26

## 2022-10-25 PROCEDURE — 45380 COLONOSCOPY AND BIOPSY: CPT | Mod: PT

## 2022-10-25 PROCEDURE — 43235 EGD DIAGNOSTIC BRUSH WASH: CPT

## 2022-10-28 LAB — SURGICAL PATHOLOGY STUDY: SIGNIFICANT CHANGE UP

## 2022-11-09 ENCOUNTER — APPOINTMENT (OUTPATIENT)
Dept: SURGERY | Facility: CLINIC | Age: 67
End: 2022-11-09

## 2022-11-09 VITALS
DIASTOLIC BLOOD PRESSURE: 85 MMHG | TEMPERATURE: 97.1 F | HEIGHT: 72 IN | OXYGEN SATURATION: 98 % | RESPIRATION RATE: 16 BRPM | BODY MASS INDEX: 31.42 KG/M2 | HEART RATE: 87 BPM | WEIGHT: 232 LBS | SYSTOLIC BLOOD PRESSURE: 145 MMHG

## 2022-11-09 DIAGNOSIS — K43.2 INCISIONAL HERNIA W/OUT OBSTRUCTION OR GANGRENE: ICD-10-CM

## 2022-11-09 DIAGNOSIS — K42.9 UMBILICAL HERNIA W/OUT OBSTRUCTION OR GANGRENE: ICD-10-CM

## 2022-11-09 PROCEDURE — 99203 OFFICE O/P NEW LOW 30 MIN: CPT

## 2022-11-09 NOTE — CONSULT LETTER
[Dear  ___] : Dear  [unfilled], [Consult Letter:] : I had the pleasure of evaluating your patient, [unfilled]. [Please see my note below.] : Please see my note below. [Consult Closing:] : Thank you very much for allowing me to participate in the care of this patient.  If you have any questions, please do not hesitate to contact me. [Sincerely,] : Sincerely, [FreeTextEntry3] : Yuri Maher MD, FACS\par  \par Department of Surgery\par [facility]\par [facility]\par

## 2022-11-09 NOTE — HISTORY OF PRESENT ILLNESS
[de-identified] : The patient comes to the office in consultation by Dr. Sebastian Dueñas for evaluation of a lump above the belly button.  The patient states that the lump has been present for about 3 to 4 years.  He states that he at times will feel some pressure in the area but no severe pain. He has no nausea, no vomit, and no abdominal pain.  He states that he has no limitations to his daily activity but thinks that the lump has become slightly larger than before.

## 2022-11-09 NOTE — ASSESSMENT
[FreeTextEntry1] : The patient is a 67 year old obese male with  an umbilical hernia and a likely recurrent ventral hernia.  The patient does not recall having surgery for a ventral or umbilical hernia, but there is a hypertrophic scar over the mass above the umbilicus.   The patient has been advised he will need a CT scan to further assess. Further recommendations will follow review of the CT scan.  The risks, benefits, and alternatives including the option of doing nothing to a robotic, possible laparoscopic, and possible open recurrent ventral and umbilical hernia repair with  mesh were discussed.  The potential complications including but not limited to infection, bleeding, hernia recurrence, chronic post-operative pain, and seroma formation were discussed.  The patient was educated regarding the signs and symptoms of hernia strangulation and advised to seek immediate MD evaluation should this occur.  The patient understands and will embark on weight loss.  The patient has been advised that weight loss will be an important adjunct to help potentially reduce the risks of infection, hernia recurrence, and chronic post operative pain associated with surgery by potentially reducing the tension along the abdominal wall. The patient will follow up upon completion of the CT scan for further recommendations.  A total of 40 minutes was spent coordinating the patient's care.

## 2022-11-09 NOTE — PHYSICAL EXAM
[JVD] : no jugular venous distention  [Purpura] : no purpura  [Petechiae] : no petechiae [Skin Ulcer] : no ulcer [Skin Induration] : no induration [Alert] : alert [Oriented to Person] : oriented to person [Oriented to Place] : oriented to place [Oriented to Time] : oriented to time [Calm] : calm [de-identified] : non toxic, in no acute distress [de-identified] : NC/AT PERRL EOMI no scleral icterus  [de-identified] : trachea midline  [de-identified] : no audible wheezing or stridor  [de-identified] : mildly obese soft, non tender, no guarding, no rebound, no masses  [de-identified] : FROM of all extremities with no gross deformity or angulation, there is a reducible umbilical hernia, there is a non reducible mass 2 to 3 fingerbreadths above the umbilicus concerning for a ventral hernia this may be recurrent as there is a hypertrophic scar overlying the area  [de-identified] : mood is calm

## 2022-12-15 ENCOUNTER — APPOINTMENT (OUTPATIENT)
Dept: CT IMAGING | Facility: CLINIC | Age: 67
End: 2022-12-15

## 2022-12-22 ENCOUNTER — RESULT REVIEW (OUTPATIENT)
Age: 67
End: 2022-12-22

## 2022-12-22 ENCOUNTER — NON-APPOINTMENT (OUTPATIENT)
Age: 67
End: 2022-12-22

## 2022-12-26 ENCOUNTER — APPOINTMENT (OUTPATIENT)
Dept: CT IMAGING | Facility: CLINIC | Age: 67
End: 2022-12-26
Payer: COMMERCIAL

## 2022-12-26 ENCOUNTER — OUTPATIENT (OUTPATIENT)
Dept: OUTPATIENT SERVICES | Facility: HOSPITAL | Age: 67
LOS: 1 days | End: 2022-12-26
Payer: COMMERCIAL

## 2022-12-26 DIAGNOSIS — K42.9 UMBILICAL HERNIA WITHOUT OBSTRUCTION OR GANGRENE: ICD-10-CM

## 2022-12-26 DIAGNOSIS — Z98.49 CATARACT EXTRACTION STATUS, UNSPECIFIED EYE: Chronic | ICD-10-CM

## 2022-12-26 DIAGNOSIS — Z98.890 OTHER SPECIFIED POSTPROCEDURAL STATES: Chronic | ICD-10-CM

## 2022-12-26 DIAGNOSIS — Z00.8 ENCOUNTER FOR OTHER GENERAL EXAMINATION: ICD-10-CM

## 2022-12-26 DIAGNOSIS — H40.11X0 PRIMARY OPEN-ANGLE GLAUCOMA, STAGE UNSPECIFIED: Chronic | ICD-10-CM

## 2022-12-26 PROCEDURE — 74177 CT ABD & PELVIS W/CONTRAST: CPT

## 2022-12-26 PROCEDURE — 74177 CT ABD & PELVIS W/CONTRAST: CPT | Mod: 26

## 2023-01-09 ENCOUNTER — APPOINTMENT (OUTPATIENT)
Dept: CARDIOLOGY | Facility: CLINIC | Age: 68
End: 2023-01-09

## 2023-01-14 ENCOUNTER — NON-APPOINTMENT (OUTPATIENT)
Age: 68
End: 2023-01-14

## 2023-01-17 ENCOUNTER — NON-APPOINTMENT (OUTPATIENT)
Age: 68
End: 2023-01-17

## 2023-01-18 ENCOUNTER — APPOINTMENT (OUTPATIENT)
Dept: DERMATOLOGY | Facility: CLINIC | Age: 68
End: 2023-01-18

## 2023-01-25 ENCOUNTER — APPOINTMENT (OUTPATIENT)
Dept: INTERNAL MEDICINE | Facility: CLINIC | Age: 68
End: 2023-01-25
Payer: COMMERCIAL

## 2023-01-25 VITALS
SYSTOLIC BLOOD PRESSURE: 122 MMHG | HEART RATE: 84 BPM | HEIGHT: 72 IN | DIASTOLIC BLOOD PRESSURE: 80 MMHG | RESPIRATION RATE: 13 BRPM | WEIGHT: 229 LBS | OXYGEN SATURATION: 98 % | BODY MASS INDEX: 31.02 KG/M2 | TEMPERATURE: 97.8 F

## 2023-01-25 DIAGNOSIS — R68.89 OTHER GENERAL SYMPTOMS AND SIGNS: ICD-10-CM

## 2023-01-25 LAB — S PYO AG SPEC QL IA: NEGATIVE

## 2023-01-25 PROCEDURE — 99072 ADDL SUPL MATRL&STAF TM PHE: CPT

## 2023-01-25 PROCEDURE — 87880 STREP A ASSAY W/OPTIC: CPT | Mod: QW

## 2023-01-25 PROCEDURE — 99203 OFFICE O/P NEW LOW 30 MIN: CPT | Mod: 25

## 2023-01-25 RX ORDER — PANTOPRAZOLE 40 MG/1
40 TABLET, DELAYED RELEASE ORAL DAILY
Qty: 90 | Refills: 1 | Status: DISCONTINUED | COMMUNITY
Start: 2021-12-20 | End: 2023-01-25

## 2023-01-25 RX ORDER — SULFAMETHOXAZOLE AND TRIMETHOPRIM 800; 160 MG/1; MG/1
800-160 TABLET ORAL TWICE DAILY
Qty: 10 | Refills: 0 | Status: DISCONTINUED | COMMUNITY
Start: 2022-10-07 | End: 2023-01-25

## 2023-01-25 RX ORDER — CELECOXIB 200 MG/1
200 CAPSULE ORAL DAILY
Qty: 30 | Refills: 0 | Status: DISCONTINUED | COMMUNITY
Start: 2022-05-11 | End: 2023-01-25

## 2023-01-25 NOTE — PHYSICAL EXAM
[No Acute Distress] : no acute distress [No Respiratory Distress] : no respiratory distress  [Clear to Auscultation] : lungs were clear to auscultation bilaterally [Normal Rate] : normal rate  [Regular Rhythm] : with a regular rhythm [Normal Affect] : the affect was normal [Normal Mood] : the mood was normal [Normal Nasal Mucosa] : the nasal mucosa was normal [No Lymphadenopathy] : no lymphadenopathy [Supple] : supple [de-identified] : left TM with slight fluid, no wax or signs of infection, no pinnal/tragal pain, not tender over TMJ joint. +PND noted

## 2023-01-25 NOTE — ASSESSMENT
[FreeTextEntry1] : Patient prescribed Flonase/Medrol Dosepak, no signs of bacterial infection on today's exam.  Follow-up with orthopedic regarding neck.Patient advised to rest/increase fluids and use supportive therapy. Patient will call if symptoms persist or worsen and return to the office as scheduled for regular followup.\par \par \par Dr. Dueñas was present in office building while I examined patient\par

## 2023-01-25 NOTE — HISTORY OF PRESENT ILLNESS
[FreeTextEntry8] : Patient presents stating he has had ongoing neck issue of which he is seeing orthopedic and taking meloxicam with benefit.  Patient states he feels discomfort in his left ear, he has slight postnasal drip/sore throat and is questioning if he has any ear infection or if this is related to his neck.  Patient had cold-like symptoms 4 weeks ago which are improving.  Patient denies fever/cough, home COVID test was negative.

## 2023-02-26 ENCOUNTER — NON-APPOINTMENT (OUTPATIENT)
Age: 68
End: 2023-02-26

## 2023-02-27 ENCOUNTER — NON-APPOINTMENT (OUTPATIENT)
Age: 68
End: 2023-02-27

## 2023-03-16 ENCOUNTER — APPOINTMENT (OUTPATIENT)
Dept: GASTROENTEROLOGY | Facility: CLINIC | Age: 68
End: 2023-03-16

## 2023-03-17 ENCOUNTER — NON-APPOINTMENT (OUTPATIENT)
Age: 68
End: 2023-03-17

## 2023-03-22 ENCOUNTER — APPOINTMENT (OUTPATIENT)
Dept: INTERNAL MEDICINE | Facility: CLINIC | Age: 68
End: 2023-03-22
Payer: COMMERCIAL

## 2023-03-22 VITALS
DIASTOLIC BLOOD PRESSURE: 78 MMHG | WEIGHT: 230 LBS | RESPIRATION RATE: 12 BRPM | OXYGEN SATURATION: 98 % | HEIGHT: 72 IN | BODY MASS INDEX: 31.15 KG/M2 | SYSTOLIC BLOOD PRESSURE: 130 MMHG | HEART RATE: 87 BPM

## 2023-03-22 DIAGNOSIS — Z00.00 ENCOUNTER FOR GENERAL ADULT MEDICAL EXAMINATION W/OUT ABNORMAL FINDINGS: ICD-10-CM

## 2023-03-22 DIAGNOSIS — Z12.5 ENCOUNTER FOR SCREENING FOR MALIGNANT NEOPLASM OF PROSTATE: ICD-10-CM

## 2023-03-22 DIAGNOSIS — E87.6 HYPOKALEMIA: ICD-10-CM

## 2023-03-22 PROCEDURE — 36415 COLL VENOUS BLD VENIPUNCTURE: CPT

## 2023-03-22 PROCEDURE — 99072 ADDL SUPL MATRL&STAF TM PHE: CPT

## 2023-03-22 PROCEDURE — 99397 PER PM REEVAL EST PAT 65+ YR: CPT | Mod: 25

## 2023-03-22 NOTE — HEALTH RISK ASSESSMENT
[Good] : ~his/her~ current health as good [Very Good] : ~his/her~  mood as very good [Yes] : Yes [2 - 3 times a week (3 pts)] : 2 - 3  times a week (3 points) [1 or 2 (0 pts)] : 1 or 2 (0 points) [Never (0 pts)] : Never (0 points) [No] : In the past 12 months have you used drugs other than those required for medical reasons? No [No falls in past year] : Patient reported no falls in the past year [0] : 2) Feeling down, depressed, or hopeless: Not at all (0) [None] : None [With Significant Other] : lives with significant other [Employed] : employed [College] : College [] :  [# Of Children ___] : has [unfilled] children [Fully functional (bathing, dressing, toileting, transferring, walking, feeding)] : Fully functional (bathing, dressing, toileting, transferring, walking, feeding) [Fully functional (using the telephone, shopping, preparing meals, housekeeping, doing laundry, using] : Fully functional and needs no help or supervision to perform IADLs (using the telephone, shopping, preparing meals, housekeeping, doing laundry, using transportation, managing medications and managing finances) [Smoke Detector] : smoke detector [Carbon Monoxide Detector] : carbon monoxide detector [Seat Belt] :  uses seat belt [Sunscreen] : uses sunscreen [Reviewed no changes] : Reviewed, no changes [Designated Healthcare Proxy] : Designated healthcare proxy [Name: ___] : Health Care Proxy's Name: [unfilled]  [Former] : Former [PHQ-2 Negative - No further assessment needed] : PHQ-2 Negative - No further assessment needed [10-14] : 10-14 [> 15 Years] : > 15 Years [Audit-CScore] : 3 [de-identified] : treadmill [de-identified] : improved [GQB4Bavbx] : 0 [Change in mental status noted] : No change in mental status noted [Reports changes in hearing] : Reports no changes in hearing [Reports changes in vision] : Reports no changes in vision [Reports changes in dental health] : Reports no changes in dental health [HepatitisCDate] : 01/14 [HepatitisCComments] : Negative [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

## 2023-03-22 NOTE — ASSESSMENT
[FreeTextEntry1] : 68-year-old male currently stable with controlled hypertension currently on losartan 100 mg daily as well as spironolactone 25 mg daily.\par He continues on potassium stating cardiology did not recommend he discontinue it once he started chlorthalidone.  Patient told he likely does not need potassium.  If today's potassium is normal then will tell patient to discontinue his potassium and repeat blood work about 3 weeks later.\par \par Hyperlipidemia on Lipitor\par \par Patient with chronic sore throat and reflux therefore potential is throat symptoms secondary to reflux.\par On omeprazole 40 mg daily\par \par Patient again encouraged to continue his diet with regular exercise and weight loss.\par \par Colonoscopy April 2017 with followup in 5 years\par \par Followup with urology as scheduled\par \par COVID and influenza vaccines already received\par Received Shingrix x2\par \par Venipuncture done today in the office\par \par Followup in 6 months\par \par

## 2023-03-22 NOTE — COUNSELING
[Potential consequences of obesity discussed] : Potential consequences of obesity discussed [Benefits of weight loss discussed] : Benefits of weight loss discussed [Encouraged to increase physical activity] : Encouraged to increase physical activity [None] : None [Needs reinforcement, provided] : Patient needs reinforcement on understanding of lifestyle changes and steps needed to achieve self management goal; reinforcement was provided [de-identified] : Continue diet and exercise

## 2023-03-22 NOTE — HISTORY OF PRESENT ILLNESS
[FreeTextEntry1] : 68-year-old black male with good general medical health in for his yearly physical.\par \par The patient has history of hypertension on losartan for about 5 years. He had a cardiac evaluation October 2019 with echocardiogram with no significant issues and stress test negative other than elevated blood pressure. Chlorthalidone was added with improved BP\par With chlorthalidone the patient was requiring multiple doses of potassium therefore cardiology discontinued it and put him on spironolactone.  He continues on 2 potassium daily.\par \par It was also recommended by cardiology that the patient start Lipitor 10 mg daily which he is on\par \par He had a good lifestyle changes but has not kept up with it but plans to restart changes.\par \par Patient is generally feeling well without complaints including no chest pain, palpitations, shortness of breath or edema.\par \par Patient does complain of a chronic sore throat and associated reflux on omeprazole with improvement.\par \par He follows with urology secondary to erectile dysfunction and increased PSA and is on Cialis.\par He does complain of 2-3 times nocturia\par \par Significant DJD of his knees for which she has received injections with minimal help.\par He is status post right total knee replacement March 2022 with benefit.  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

## 2023-03-23 ENCOUNTER — NON-APPOINTMENT (OUTPATIENT)
Age: 68
End: 2023-03-23

## 2023-03-23 LAB
ALBUMIN SERPL ELPH-MCNC: 4.5 G/DL
ALP BLD-CCNC: 93 U/L
ALT SERPL-CCNC: 17 U/L
ANION GAP SERPL CALC-SCNC: 13 MMOL/L
APPEARANCE: CLEAR
AST SERPL-CCNC: 25 U/L
BACTERIA: NEGATIVE
BASOPHILS # BLD AUTO: 0.02 K/UL
BASOPHILS NFR BLD AUTO: 0.4 %
BILIRUB SERPL-MCNC: 0.9 MG/DL
BILIRUBIN URINE: NEGATIVE
BLOOD URINE: NEGATIVE
BUN SERPL-MCNC: 10 MG/DL
CALCIUM SERPL-MCNC: 9.8 MG/DL
CHLORIDE SERPL-SCNC: 103 MMOL/L
CHOLEST SERPL-MCNC: 145 MG/DL
CO2 SERPL-SCNC: 25 MMOL/L
COLOR: YELLOW
CREAT SERPL-MCNC: 1.14 MG/DL
EGFR: 71 ML/MIN/1.73M2
EOSINOPHIL # BLD AUTO: 0.11 K/UL
EOSINOPHIL NFR BLD AUTO: 2.1 %
ESTIMATED AVERAGE GLUCOSE: 97 MG/DL
GLUCOSE QUALITATIVE U: NEGATIVE
GLUCOSE SERPL-MCNC: 94 MG/DL
HBA1C MFR BLD HPLC: 5 %
HCT VFR BLD CALC: 41.5 %
HDLC SERPL-MCNC: 50 MG/DL
HGB BLD-MCNC: 13.8 G/DL
HYALINE CASTS: 0 /LPF
IMM GRANULOCYTES NFR BLD AUTO: 0.4 %
KETONES URINE: NEGATIVE
LDLC SERPL CALC-MCNC: 77 MG/DL
LEUKOCYTE ESTERASE URINE: NEGATIVE
LYMPHOCYTES # BLD AUTO: 1.55 K/UL
LYMPHOCYTES NFR BLD AUTO: 30 %
MAGNESIUM SERPL-MCNC: 1.9 MG/DL
MAN DIFF?: NORMAL
MCHC RBC-ENTMCNC: 31 PG
MCHC RBC-ENTMCNC: 33.3 GM/DL
MCV RBC AUTO: 93.3 FL
MICROSCOPIC-UA: NORMAL
MONOCYTES # BLD AUTO: 0.68 K/UL
MONOCYTES NFR BLD AUTO: 13.2 %
NEUTROPHILS # BLD AUTO: 2.79 K/UL
NEUTROPHILS NFR BLD AUTO: 53.9 %
NITRITE URINE: NEGATIVE
NONHDLC SERPL-MCNC: 95 MG/DL
PH URINE: 6
PLATELET # BLD AUTO: 222 K/UL
POTASSIUM SERPL-SCNC: 4.2 MMOL/L
PROT SERPL-MCNC: 7.7 G/DL
PROTEIN URINE: NEGATIVE
PSA SERPL-MCNC: 2 NG/ML
RBC # BLD: 4.45 M/UL
RBC # FLD: 12.4 %
RED BLOOD CELLS URINE: 2 /HPF
SODIUM SERPL-SCNC: 141 MMOL/L
SPECIFIC GRAVITY URINE: 1.01
SQUAMOUS EPITHELIAL CELLS: 0 /HPF
TRIGL SERPL-MCNC: 91 MG/DL
UROBILINOGEN URINE: NORMAL
VIT B12 SERPL-MCNC: 198 PG/ML
WBC # FLD AUTO: 5.17 K/UL
WHITE BLOOD CELLS URINE: 0 /HPF

## 2023-03-23 RX ORDER — POTASSIUM CHLORIDE 1500 MG/1
20 TABLET, FILM COATED, EXTENDED RELEASE ORAL
Qty: 180 | Refills: 1 | Status: DISCONTINUED | COMMUNITY
Start: 2020-10-20 | End: 2023-03-23

## 2023-04-13 ENCOUNTER — NON-APPOINTMENT (OUTPATIENT)
Age: 68
End: 2023-04-13

## 2023-04-18 LAB
ANION GAP SERPL CALC-SCNC: 13 MMOL/L
BUN SERPL-MCNC: 11 MG/DL
CALCIUM SERPL-MCNC: 9.2 MG/DL
CHLORIDE SERPL-SCNC: 101 MMOL/L
CO2 SERPL-SCNC: 23 MMOL/L
CREAT SERPL-MCNC: 1.1 MG/DL
EGFR: 74 ML/MIN/1.73M2
GLUCOSE SERPL-MCNC: 111 MG/DL
POTASSIUM SERPL-SCNC: 3.7 MMOL/L
SODIUM SERPL-SCNC: 137 MMOL/L

## 2023-05-03 ENCOUNTER — APPOINTMENT (OUTPATIENT)
Dept: UROLOGY | Facility: CLINIC | Age: 68
End: 2023-05-03
Payer: COMMERCIAL

## 2023-05-03 DIAGNOSIS — R35.0 FREQUENCY OF MICTURITION: ICD-10-CM

## 2023-05-03 PROCEDURE — 99072 ADDL SUPL MATRL&STAF TM PHE: CPT

## 2023-05-03 PROCEDURE — 99244 OFF/OP CNSLTJ NEW/EST MOD 40: CPT

## 2023-05-04 LAB
APPEARANCE: CLEAR
BACTERIA: NEGATIVE /HPF
BILIRUBIN URINE: NEGATIVE
BLOOD URINE: NEGATIVE
CAST: 0 /LPF
COLOR: YELLOW
EPITHELIAL CELLS: 0 /HPF
GLUCOSE QUALITATIVE U: NEGATIVE MG/DL
KETONES URINE: NEGATIVE MG/DL
LEUKOCYTE ESTERASE URINE: NEGATIVE
MICROSCOPIC-UA: NORMAL
NITRITE URINE: NEGATIVE
PH URINE: 6
PROTEIN URINE: NEGATIVE MG/DL
RED BLOOD CELLS URINE: 0 /HPF
SPECIFIC GRAVITY URINE: 1.01
UROBILINOGEN URINE: 0.2 MG/DL
WHITE BLOOD CELLS URINE: 0 /HPF

## 2023-05-05 LAB
PSA FREE FLD-MCNC: 19 %
PSA FREE SERPL-MCNC: 0.34 NG/ML
PSA SERPL-MCNC: 1.75 NG/ML

## 2023-06-09 NOTE — ED ADULT NURSE NOTE - ATTEMPT TO OOB
Condition:: R 5th finger Please Describe Your Condition:: Damaged in car door, question on healing process. Condition:: R Ankle Please Describe Your Condition:: Pt states that ankle is irritated and turns red when it is rubbed. no

## 2023-06-26 ENCOUNTER — APPOINTMENT (OUTPATIENT)
Dept: ORTHOPEDIC SURGERY | Facility: CLINIC | Age: 68
End: 2023-06-26
Payer: COMMERCIAL

## 2023-06-26 DIAGNOSIS — M17.11 UNILATERAL PRIMARY OSTEOARTHRITIS, RIGHT KNEE: ICD-10-CM

## 2023-06-26 PROCEDURE — 73562 X-RAY EXAM OF KNEE 3: CPT | Mod: RT

## 2023-06-26 PROCEDURE — 99214 OFFICE O/P EST MOD 30 MIN: CPT

## 2023-06-26 NOTE — IMAGING
[Right] : right knee [AP] : anteroposterior [Lateral] : lateral [[place]] : skyline [Components well fixed, in good position] : Components well fixed, in good position [de-identified] : Right knee: Inc healed.  ROM 0-100.  Lig stable.  No effusion.  Walks without assistance.

## 2023-06-26 NOTE — HISTORY OF PRESENT ILLNESS
[Result of repetitive motion] : result of repetitive motion [2] : 2 [Throbbing] : throbbing [Tightness] : tightness [Occasional] : occasional [] : Post Surgical Visit: yes [de-identified] : 6/26/23: 1 year s/p right TKA, no pain but feels stiff.\par \par Prev doc:\par 1/4/22: 65 yo M with longstanding bilateral knee pain for many years with no specific injury. He has done HA series ~2-3yrs ago to some benefit. Wears compression brace to some benefit.\par 4/11/22: 12 days s/p R TKA doing well with minimal pain. Denies fevers/chills. \par 5/23/22: 7 weeks s/p R TKA doing well still has minimal pain. Doing slightly better with PT\par 6/27/22: 12 weeks s/p R TKA doing well with some stiffness and mild pain. Doing well with PT [de-identified] : 3.30.22 [de-identified] : TKA Rt Knee

## 2023-06-26 NOTE — PHYSICAL EXAM
[de-identified] : Effected side: right \par Incision is clean and dry with no drainage\par Sensation intact\par no edema LE\par Decreased ROM: 3-105passive 115active\par \par \par

## 2023-06-26 NOTE — DISCUSSION/SUMMARY
[de-identified] : The patient was advised of the diagnosis.  The natural history of the pathology was explained in full to the patient in layman's terms. All questions were answered.  The risks and benefits of surgical and non-surgical treatment alternatives were explained in full to the patient.\par

## 2023-06-26 NOTE — ASSESSMENT
[FreeTextEntry1] : Previous doc:\par 1/4/22: Bilateral R>L advanced knee OA, varus deformity. He has pain daily, especially with activity. Has done CSI and HA series in the past. Discussed TKA and r/b/a in detail. Discussed recovery of doing one TKA at a time vs. bilateral TKA. Advised against traveling for 8 wks after surgery as it increases risk of blood clots. Will obtain CT of bilateral knees for presurgical evaluation and to evaluate for bone loss. Patient would like to proceed with R TKA in 03/2022. f/up prior to surgery\par 4/11/22: 12 days postop with minimal pain. Still has some stiffness as he flexes to 85 on exam. Advised of importance of PT and HEP.  FU 4 weeks\par 5/23/22: 7 weeks postop with minimal pain. Continue with PT to work on ROM \par 6/27/22: 12 weeks postop doing well with some stiffness however ROM preop was 85 and now [phone] in office. He has been gradually returning to normal activity. Finish out PT and transition to HEP. Follow up 1 year postop \par \par 6/26/23: 1 year postop, no pain but only gets to about 100.  Explained that this is expected based on his preop flex 85 but his left side iis 125 - will try some PT, unlikely synovectomy would benefit him at this point as he is significantly better than preop.  start Diclofenac as well - had allergy to Mobic in the past -

## 2023-08-14 ENCOUNTER — APPOINTMENT (OUTPATIENT)
Dept: ORTHOPEDIC SURGERY | Facility: CLINIC | Age: 68
End: 2023-08-14
Payer: COMMERCIAL

## 2023-08-14 VITALS — WEIGHT: 230 LBS | BODY MASS INDEX: 31.15 KG/M2 | HEIGHT: 72 IN

## 2023-08-14 PROCEDURE — 99213 OFFICE O/P EST LOW 20 MIN: CPT

## 2023-08-14 NOTE — DISCUSSION/SUMMARY
[de-identified] : The patient was advised of the diagnosis.  The natural history of the pathology was explained in full to the patient in layman's terms. All questions were answered.  The risks and benefits of surgical and non-surgical treatment alternatives were explained in full to the patient.  Entered by Azeb Samuel acting as a scribe.

## 2023-08-14 NOTE — HISTORY OF PRESENT ILLNESS
[Result of repetitive motion] : result of repetitive motion [2] : 2 [Throbbing] : throbbing [Tightness] : tightness [Occasional] : occasional [] : Post Surgical Visit: yes [de-identified] : 8/14/23: Pt still having some stiffness in knee when bending- no real changes. Pt has not attended PT yet.   Prev doc: 1/4/22: 65 yo M with longstanding bilateral knee pain for many years with no specific injury. He has done HA series ~2-3yrs ago to some benefit. Wears compression brace to some benefit. 4/11/22: 12 days s/p R TKA doing well with minimal pain. Denies fevers/chills.  5/23/22: 7 weeks s/p R TKA doing well still has minimal pain. Doing slightly better with PT 6/27/22: 12 weeks s/p R TKA doing well with some stiffness and mild pain. Doing well with PT 6/26/23: 1 year s/p right TKA, no pain but feels stiff. [de-identified] : 3.30.22 [de-identified] : TKA Rt Knee

## 2023-08-14 NOTE — ASSESSMENT
[FreeTextEntry1] : Previous doc: 1/4/22: Bilateral R>L advanced knee OA, varus deformity. He has pain daily, especially with activity. Has done CSI and HA series in the past. Discussed TKA and r/b/a in detail. Discussed recovery of doing one TKA at a time vs. bilateral TKA. Advised against traveling for 8 wks after surgery as it increases risk of blood clots. Will obtain CT of bilateral knees for presurgical evaluation and to evaluate for bone loss. Patient would like to proceed with R TKA in 03/2022. f/up prior to surgery 4/11/22: 12 days postop with minimal pain. Still has some stiffness as he flexes to 85 on exam. Advised of importance of PT and HEP.  FU 4 weeks 5/23/22: 7 weeks postop with minimal pain. Continue with PT to work on ROM  6/27/22: 12 weeks postop doing well with some stiffness however ROM preop was 85 and now [phone] in office. He has been gradually returning to normal activity. Finish out PT and transition to HEP. Follow up 1 year postop  6/26/23: 1 year postop, no pain but only gets to about 100.  Explained that this is expected based on his preop flex 85 but his left side iis 125 - will try some PT, unlikely synovectomy would benefit him at this point as he is significantly better than preop.  start Diclofenac as well - had allergy to Mobic in the past -    8/14/23: Discussed conservative vs surgical tx options to address stiffness. Pt will continue with PT for now- discussed importance of stretching and exercise.

## 2023-08-14 NOTE — IMAGING
[Right] : right knee [AP] : anteroposterior [Lateral] : lateral [[place]] : skyline [Components well fixed, in good position] : Components well fixed, in good position [de-identified] : Right knee: Inc healed.  ROM 0-100.  Lig stable.  No effusion.  Walks without assistance.

## 2023-09-18 ENCOUNTER — APPOINTMENT (OUTPATIENT)
Dept: INTERNAL MEDICINE | Facility: CLINIC | Age: 68
End: 2023-09-18

## 2023-10-12 ENCOUNTER — NON-APPOINTMENT (OUTPATIENT)
Age: 68
End: 2023-10-12

## 2023-10-30 ENCOUNTER — APPOINTMENT (OUTPATIENT)
Dept: INTERNAL MEDICINE | Facility: CLINIC | Age: 68
End: 2023-10-30
Payer: COMMERCIAL

## 2023-10-30 VITALS
HEIGHT: 72 IN | WEIGHT: 226 LBS | DIASTOLIC BLOOD PRESSURE: 80 MMHG | BODY MASS INDEX: 30.61 KG/M2 | RESPIRATION RATE: 13 BRPM | SYSTOLIC BLOOD PRESSURE: 124 MMHG | HEART RATE: 76 BPM | OXYGEN SATURATION: 98 %

## 2023-10-30 DIAGNOSIS — E53.8 DEFICIENCY OF OTHER SPECIFIED B GROUP VITAMINS: ICD-10-CM

## 2023-10-30 DIAGNOSIS — E66.9 OBESITY, UNSPECIFIED: ICD-10-CM

## 2023-10-30 PROCEDURE — 90677 PCV20 VACCINE IM: CPT

## 2023-10-30 PROCEDURE — 90472 IMMUNIZATION ADMIN EACH ADD: CPT

## 2023-10-30 PROCEDURE — 90662 IIV NO PRSV INCREASED AG IM: CPT

## 2023-10-30 PROCEDURE — G0009: CPT

## 2023-10-30 RX ORDER — OMEPRAZOLE 40 MG/1
40 CAPSULE, DELAYED RELEASE ORAL
Qty: 30 | Refills: 4 | Status: DISCONTINUED | COMMUNITY
Start: 2022-10-25 | End: 2023-10-30

## 2023-10-30 RX ORDER — FLUTICASONE PROPIONATE 50 UG/1
50 SPRAY, METERED NASAL
Qty: 1 | Refills: 0 | Status: DISCONTINUED | COMMUNITY
Start: 2023-01-25 | End: 2023-10-30

## 2023-10-30 RX ORDER — METHYLPREDNISOLONE 4 MG/1
4 TABLET ORAL
Qty: 1 | Refills: 0 | Status: DISCONTINUED | COMMUNITY
Start: 2023-01-25 | End: 2023-10-30

## 2023-10-31 DIAGNOSIS — Z23 ENCOUNTER FOR IMMUNIZATION: ICD-10-CM

## 2023-11-03 ENCOUNTER — NON-APPOINTMENT (OUTPATIENT)
Age: 68
End: 2023-11-03

## 2023-11-29 ENCOUNTER — APPOINTMENT (OUTPATIENT)
Dept: UROLOGY | Facility: CLINIC | Age: 68
End: 2023-11-29
Payer: COMMERCIAL

## 2023-11-29 DIAGNOSIS — N52.9 MALE ERECTILE DYSFUNCTION, UNSPECIFIED: ICD-10-CM

## 2023-11-29 LAB
APPEARANCE: CLEAR
BACTERIA: NEGATIVE /HPF
BILIRUBIN URINE: NEGATIVE
BLOOD URINE: NEGATIVE
CAST: 0 /LPF
COLOR: YELLOW
EPITHELIAL CELLS: 0 /HPF
GLUCOSE QUALITATIVE U: NEGATIVE MG/DL
KETONES URINE: NEGATIVE MG/DL
LEUKOCYTE ESTERASE URINE: NEGATIVE
MICROSCOPIC-UA: NORMAL
NITRITE URINE: NEGATIVE
PH URINE: 8
PROTEIN URINE: NEGATIVE MG/DL
PSA FREE FLD-MCNC: 21 %
PSA FREE SERPL-MCNC: 0.39 NG/ML
PSA SERPL-MCNC: 1.87 NG/ML
RED BLOOD CELLS URINE: 0 /HPF
SPECIFIC GRAVITY URINE: 1.01
UROBILINOGEN URINE: 1 MG/DL
WHITE BLOOD CELLS URINE: 0 /HPF

## 2023-11-29 PROCEDURE — 99214 OFFICE O/P EST MOD 30 MIN: CPT

## 2023-11-30 ENCOUNTER — APPOINTMENT (OUTPATIENT)
Dept: UROLOGY | Facility: CLINIC | Age: 68
End: 2023-11-30

## 2023-12-01 RX ORDER — PAPAVERINE HYDROCHLORIDE 30 MG/ML
30 INJECTION, SOLUTION INTRAVENOUS
Qty: 10 | Refills: 3 | Status: ACTIVE | COMMUNITY
Start: 2023-12-01 | End: 1900-01-01

## 2023-12-27 ENCOUNTER — EMERGENCY (EMERGENCY)
Facility: HOSPITAL | Age: 68
LOS: 1 days | Discharge: HOME | End: 2023-12-27
Attending: STUDENT IN AN ORGANIZED HEALTH CARE EDUCATION/TRAINING PROGRAM
Payer: COMMERCIAL

## 2023-12-27 VITALS
HEART RATE: 81 BPM | RESPIRATION RATE: 20 BRPM | OXYGEN SATURATION: 97 % | SYSTOLIC BLOOD PRESSURE: 146 MMHG | HEIGHT: 73 IN | DIASTOLIC BLOOD PRESSURE: 80 MMHG | TEMPERATURE: 98 F | WEIGHT: 238.1 LBS

## 2023-12-27 DIAGNOSIS — H40.11X0 PRIMARY OPEN-ANGLE GLAUCOMA, STAGE UNSPECIFIED: Chronic | ICD-10-CM

## 2023-12-27 DIAGNOSIS — Z98.49 CATARACT EXTRACTION STATUS, UNSPECIFIED EYE: Chronic | ICD-10-CM

## 2023-12-27 DIAGNOSIS — Z98.890 OTHER SPECIFIED POSTPROCEDURAL STATES: Chronic | ICD-10-CM

## 2023-12-27 PROCEDURE — 99053 MED SERV 10PM-8AM 24 HR FAC: CPT

## 2023-12-27 PROCEDURE — 99284 EMERGENCY DEPT VISIT MOD MDM: CPT

## 2023-12-28 PROCEDURE — 71046 X-RAY EXAM CHEST 2 VIEWS: CPT

## 2023-12-28 PROCEDURE — 99283 EMERGENCY DEPT VISIT LOW MDM: CPT

## 2023-12-28 PROCEDURE — 96372 THER/PROPH/DIAG INJ SC/IM: CPT

## 2023-12-28 PROCEDURE — 71046 X-RAY EXAM CHEST 2 VIEWS: CPT | Mod: 26

## 2023-12-28 RX ORDER — KETOROLAC TROMETHAMINE 30 MG/ML
30 SYRINGE (ML) INJECTION ONCE
Refills: 0 | Status: DISCONTINUED | OUTPATIENT
Start: 2023-12-28 | End: 2023-12-28

## 2023-12-28 RX ORDER — LIDOCAINE 4 G/100G
1 CREAM TOPICAL
Qty: 1 | Refills: 0
Start: 2023-12-28

## 2023-12-28 RX ORDER — METHOCARBAMOL 500 MG/1
1500 TABLET, FILM COATED ORAL ONCE
Refills: 0 | Status: COMPLETED | OUTPATIENT
Start: 2023-12-28 | End: 2023-12-28

## 2023-12-28 RX ORDER — ACETAMINOPHEN 500 MG
975 TABLET ORAL ONCE
Refills: 0 | Status: COMPLETED | OUTPATIENT
Start: 2023-12-28 | End: 2023-12-28

## 2023-12-28 RX ORDER — LIDOCAINE 4 G/100G
1 CREAM TOPICAL ONCE
Refills: 0 | Status: COMPLETED | OUTPATIENT
Start: 2023-12-28 | End: 2023-12-28

## 2023-12-28 RX ORDER — IBUPROFEN 200 MG
1 TABLET ORAL
Qty: 20 | Refills: 0
Start: 2023-12-28

## 2023-12-28 RX ORDER — METHOCARBAMOL 500 MG/1
1 TABLET, FILM COATED ORAL
Qty: 15 | Refills: 0
Start: 2023-12-28

## 2023-12-28 RX ADMIN — Medication 975 MILLIGRAM(S): at 01:29

## 2023-12-28 RX ADMIN — LIDOCAINE 1 PATCH: 4 CREAM TOPICAL at 01:29

## 2023-12-28 RX ADMIN — METHOCARBAMOL 1500 MILLIGRAM(S): 500 TABLET, FILM COATED ORAL at 01:29

## 2023-12-28 RX ADMIN — Medication 30 MILLIGRAM(S): at 01:28

## 2023-12-28 NOTE — ED PROVIDER NOTE - PATIENT PORTAL LINK FT
You can access the FollowMyHealth Patient Portal offered by [facility] by registering at the following website: [email or web address]. By joining [employer]’s FollowMyHealth portal, you will also be able to view your health information using other applications (apps) compatible with our system. You can access the FollowMyHealth Patient Portal offered by [facility] by registering at the following website: [email or web address]. By joining [facility]’s FollowMyHealth portal, you will also be able to view your health information using other applications (apps) compatible with our system.

## 2023-12-28 NOTE — ED PROVIDER NOTE - PHYSICAL EXAMINATION
Gen: No acute distress, non toxic  HEENT: Mucous membranes moist, pink conjunctivae, EOMI  CV: RRR, nl s1/s2.  Resp: CTAB, normal rate and effort  GI: Abdomen soft, non-tender  : No CVAT  Neuro: A&O x 3, moving all 4 extremities  MSK: No midline spine tenderness to palpation. +Mild tenderness along right paraspinal region and right lateral chest wall/ribs, 5/5 strength BUE/BLE, sensation intact throughout, 2+ distal pulses  Skin: No rashes. intact and perfused.

## 2023-12-28 NOTE — ED ADULT NURSE NOTE - NSFALLUNIVINTERV_ED_ALL_ED
Bed/Stretcher in lowest position, wheels locked, appropriate side rails in place/Call bell, personal items and telephone in reach/Instruct patient to call for assistance before getting out of bed/chair/stretcher/Non-slip footwear applied when patient is off stretcher/[place] to call system/Physically safe environment - no spills, clutter or unnecessary equipment/Purposeful proactive rounding/Room/bathroom lighting operational, light cord in reach Bed/Stretcher in lowest position, wheels locked, appropriate side rails in place/Call bell, personal items and telephone in reach/Instruct patient to call for assistance before getting out of bed/chair/stretcher/Non-slip footwear applied when patient is off stretcher/[place] to call system/Physically safe environment - no spills, clutter or unnecessary equipment/Purposeful proactive rounding/Room/bathroom lighting operational, light cord in reach

## 2023-12-28 NOTE — ED PROVIDER NOTE - NSFOLLOWUPINSTRUCTIONS_ED_ALL_ED_FT
Please take ibuprofen 600mg every 6 hours as needed for pain  Please take tylenol 650mg every 6hours as needed for pain  Take robaxin 750mg every 8 hours as needed for musculoskeletal pain - No driving or operating heavy machinery while taking robaxin  Follow up with primary care doctor in 2-3 days  Return to ER for new or worsening symptoms    Strain    A strain is a stretch or tear in one of the muscles in your body. This is caused by an injury to the area such as a twisting mechanism. Symptoms include pain, swelling, or bruising. Rest that area over the next several days and slowly resume activity when tolerated. Ice can help with swelling and pain.     SEEK IMMEDIATE MEDICAL CARE IF YOU HAVE ANY OF THE FOLLOWING SYMPTOMS: worsening pain, inability to move that body part, numbness or tingling.

## 2023-12-28 NOTE — ED PROVIDER NOTE - CLINICAL SUMMARY MEDICAL DECISION MAKING FREE TEXT BOX
67 yo M presents to ER c/o right sided back pain radiating around right ribs. Pt reports lifting a lot of heavy boxes the last few days. Pain onset since last night, constant, worse with movement.  +TTP R lateral chest wall/ribs /paraspinal region. Heart RRR , lungs CTA, abd non-tender. CXR unremarkable. Sx improved with robaxin lidoderm patch toradol and tylenol . likely muscle strain, will send rx meds, advised f/u pcp and return precautions

## 2023-12-28 NOTE — ED PROVIDER NOTE - OBJECTIVE STATEMENT
69 yo M presents to ER c/o right sided back pain radiating around right ribs. Pt reports lifting a lot of heavy boxes the last few days. Pain onset since last night, constant, worse with movement. Pt tried taking advil and an old tramadol w/o relief. Denies fall/trauma. Denies CP, SOB, cough, n/v/d, rashes, numbness, tingling , weakness.

## 2023-12-28 NOTE — ED PROVIDER NOTE - ATTENDING APP SHARED VISIT CONTRIBUTION OF CARE
68y M w/ hx HTN, HLD; presents for chest/back pain that started after lifting heavy boxes today. No fall or trauma. On exam, pt well appearing and in no distress. Lungs CTAB. +Mild tenderness to right lateral lower ribs, no crepitus. CXR negative. Pt felt improved with symptomatic treatment. Likely muscle strain. Medically stable for discharge.

## 2023-12-28 NOTE — ED ADULT NURSE NOTE - OBJECTIVE STATEMENT
Presents to ed with complaints of right sided back pain since 12/26/23, patient reports lifting chairs at home and since then experiencing pain. Patient reports the pain is mild with rest and increases with movement. Denies trauma, denies chest pain, denies sob. No other complaints at this time.

## 2023-12-30 ENCOUNTER — NON-APPOINTMENT (OUTPATIENT)
Age: 68
End: 2023-12-30

## 2024-01-17 RX ORDER — LOSARTAN POTASSIUM 100 MG/1
100 TABLET, FILM COATED ORAL DAILY
Qty: 90 | Refills: 1 | Status: ACTIVE | COMMUNITY
Start: 2017-07-08 | End: 1900-01-01

## 2024-01-22 ENCOUNTER — APPOINTMENT (OUTPATIENT)
Dept: DERMATOLOGY | Facility: CLINIC | Age: 69
End: 2024-01-22

## 2024-02-26 ENCOUNTER — APPOINTMENT (OUTPATIENT)
Dept: ORTHOPEDIC SURGERY | Facility: CLINIC | Age: 69
End: 2024-02-26
Payer: COMMERCIAL

## 2024-02-26 VITALS — HEIGHT: 72 IN | WEIGHT: 226 LBS | BODY MASS INDEX: 30.61 KG/M2

## 2024-02-26 DIAGNOSIS — Z96.651 PRESENCE OF RIGHT ARTIFICIAL KNEE JOINT: ICD-10-CM

## 2024-02-26 PROCEDURE — 73562 X-RAY EXAM OF KNEE 3: CPT | Mod: RT

## 2024-02-26 PROCEDURE — 99213 OFFICE O/P EST LOW 20 MIN: CPT

## 2024-02-26 NOTE — IMAGING
[Right] : right knee [Lateral] : lateral [AP] : anteroposterior [[place]] : [place] [Components well fixed, in good position] : Components well fixed, in good position [de-identified] : Right knee: Inc healed.  ROM 0-95.  Lig stable.  No effusion.  Walks without assistance.  Left knee: ROM 0-120.

## 2024-02-26 NOTE — ASSESSMENT
[FreeTextEntry1] : Previous doc: 1/4/22: Bilateral R>L advanced knee OA, varus deformity. He has pain daily, especially with activity. Has done CSI and HA series in the past. Discussed TKA and r/b/a in detail. Discussed recovery of doing one TKA at a time vs. bilateral TKA. Advised against traveling for 8 wks after surgery as it increases risk of blood clots. Will obtain CT of bilateral knees for presurgical evaluation and to evaluate for bone loss. Patient would like to proceed with R TKA in 03/2022. f/up prior to surgery 4/11/22: 12 days postop with minimal pain. Still has some stiffness as he flexes to 85 on exam. Advised of importance of PT and HEP.  FU 4 weeks 5/23/22: 7 weeks postop with minimal pain. Continue with PT to work on ROM  6/27/22: 12 weeks postop doing well with some stiffness however ROM preop was 85 and now [phone] in office. He has been gradually returning to normal activity. Finish out PT and transition to HEP. Follow up 1 year postop  6/26/23: 1 year postop, no pain but only gets to about 100.  Explained that this is expected based on his preop flex 85 but his left side iis 125 - will try some PT, unlikely synovectomy would benefit him at this point as he is significantly better than preop.  start Diclofenac as well - had allergy to Mobic in the past -   8/14/23: Discussed conservative vs surgical tx options to address stiffness. Pt will continue with PT for now- discussed importance of stretching and exercise.   2/26/24: No change in right knee, no effusion - cont PT/HEP for arthrofibrosis.

## 2024-02-26 NOTE — HISTORY OF PRESENT ILLNESS
[de-identified] : 2/26/24: Noticed some swelling in right knee 3 weeks ago, denies pain.  No fevers/chills.  Prev doc: 1/4/22: 67 yo M with longstanding bilateral knee pain for many years with no specific injury. He has done HA series ~2-3yrs ago to some benefit. Wears compression brace to some benefit. 4/11/22: 12 days s/p R TKA doing well with minimal pain. Denies fevers/chills.  5/23/22: 7 weeks s/p R TKA doing well still has minimal pain. Doing slightly better with PT 6/27/22: 12 weeks s/p R TKA doing well with some stiffness and mild pain. Doing well with PT 6/26/23: 1 year s/p right TKA, no pain but feels stiff. 8/14/23: Pt still having some stiffness in knee when bending- no real changes. Pt has not attended PT yet.

## 2024-02-26 NOTE — OCCUPATIONAL THERAPY INITIAL EVALUATION ADULT - STANDING BALANCE: DYNAMIC, REHAB EVAL
w/ RW/fair plus

## 2024-02-26 NOTE — DISCUSSION/SUMMARY
[de-identified] : The patient was advised of the diagnosis.  The natural history of the pathology was explained in full to the patient in layman's terms. All questions were answered.  The risks and benefits of surgical and non-surgical treatment alternatives were explained in full to the patient.

## 2024-03-13 ENCOUNTER — APPOINTMENT (OUTPATIENT)
Dept: UROLOGY | Facility: CLINIC | Age: 69
End: 2024-03-13
Payer: COMMERCIAL

## 2024-03-13 DIAGNOSIS — N40.1 BENIGN PROSTATIC HYPERPLASIA WITH LOWER URINARY TRACT SYMPMS: ICD-10-CM

## 2024-03-13 DIAGNOSIS — N13.8 BENIGN PROSTATIC HYPERPLASIA WITH LOWER URINARY TRACT SYMPMS: ICD-10-CM

## 2024-03-13 PROCEDURE — G2211 COMPLEX E/M VISIT ADD ON: CPT

## 2024-03-13 PROCEDURE — 99214 OFFICE O/P EST MOD 30 MIN: CPT

## 2024-03-13 NOTE — PHYSICAL EXAM
[Normal Appearance] : normal appearance [Well Groomed] : well groomed [General Appearance - In No Acute Distress] : no acute distress [Edema] : no peripheral edema [Respiration, Rhythm And Depth] : normal respiratory rhythm and effort [Exaggerated Use Of Accessory Muscles For Inspiration] : no accessory muscle use [Abdomen Tenderness] : non-tender [Abdomen Soft] : soft [Costovertebral Angle Tenderness] : no ~M costovertebral angle tenderness [Urinary Bladder Findings] : the bladder was normal on palpation [Normal Station and Gait] : the gait and station were normal for the patient's age [] : no rash [No Focal Deficits] : no focal deficits [Oriented To Time, Place, And Person] : oriented to person, place, and time [Mood] : the mood was normal [Affect] : the affect was normal [No Palpable Adenopathy] : no palpable adenopathy

## 2024-03-14 LAB
APPEARANCE: CLEAR
BACTERIA: NEGATIVE /HPF
BILIRUBIN URINE: NEGATIVE
BLOOD URINE: NEGATIVE
CAST: 1 /LPF
COLOR: ABNORMAL
EPITHELIAL CELLS: 0 /HPF
GLUCOSE QUALITATIVE U: NEGATIVE
KETONES URINE: NEGATIVE
LEUKOCYTE ESTERASE URINE: NEGATIVE
MICROSCOPIC-UA: NORMAL
NITRITE URINE: NEGATIVE
PH URINE: 6.5
PROTEIN URINE: NEGATIVE
PSA FREE FLD-MCNC: 20 %
PSA FREE SERPL-MCNC: 0.41 NG/ML
PSA SERPL-MCNC: 2.09 NG/ML
RED BLOOD CELLS URINE: 2 /HPF
SPECIFIC GRAVITY URINE: 1.01
UROBILINOGEN URINE: NORMAL
WHITE BLOOD CELLS URINE: 0 /HPF

## 2024-04-18 ENCOUNTER — APPOINTMENT (OUTPATIENT)
Dept: INTERNAL MEDICINE | Facility: CLINIC | Age: 69
End: 2024-04-18

## 2024-04-19 NOTE — ED PROVIDER NOTE - CROS ED ENMT ALL NEG
CPAP Device Patient Instruction      Indicated below is who you have approved as your Durable Medical Equipment (DME) provider:    [facility]    Phone: [phone] (supplies) and [phone] (scheduling)  [facility]: [phone]  Email: [email or web address]  Address: [address] [place] [address]  Website:  [email or web address]           There is a modem in your machine that tracks specific information:   How much you are using the CPAP machine   If your apnea is controlled  If your mask is leaking      Follow up appointments and download reports are needed to monitor your health and sleep apnea.  Insurance companies also request this information for compliance and to receive replacement CPAP supplies.      If at any time prior to your follow up appointment you are having trouble tolerating the CPAP or not finding it effective; please do not just stop using the CPAP.  Please call our office for further discussion and troubleshooting.  Your home care provider also has respiratory therapists available to assist you in troubleshooting.        Please make sure to keep this DME provider information on hand for any future questions or concerns with your CPAP device.      Please know when your insurance will approve replacement supplies. You will need to call your supplier for new supplies at that time.      If you have any questions or concerns, please call our office:   [facility]: [phone]  [facility]: [phone]     MD Toshia Leal APNP     CPAP / BiPAP Care and Cleaning Instructions    Follow the ’s recommendations.  Do not take your mask apart unless you know how to put it back together.     Mask: Clean daily with a damp washcloth or baby wipe.  Clean weekly with warm water and baby shampoo or other mild soap.     Hose/Tubing: Clean weekly with warm water and baby shampoo or other mild soap.  Hang over a shower barby to dry.      Humidifier chamber: Fill nightly with distilled water.  Every morning, empty the excess water and let the chamber air dry.  Clean weekly with above.     Air filter: A white filter should be removed weekly and the dust flicked off with your fingers.  Do not wash the white filter.  Replace when unable to flick away dust.      All of the above parts are REPLACEABLE and it is recommended to change them out routinely!  Overtime, dirt and oils from your skin can soften the cushion, making it so it doesn't hold a tight seal anymore with your face.  It also affects hygiene.  This creates air leaks that can affect how effective your CPAP therapy is.  Call the company that provided your CPAP/BiPAP machine for help with ordering. Some parts are replaceable monthly and others every 3-6 months.  Ask, and then bob your calendar.  When due, they will be covered by insurance.      Schedule below is typical for most insurances:   Full-Face Cushion: 1 per month  Replaceable Pillow or Cushions: 2 per month   Disposable Filter: 2 per month   Mask frame: 1 every 3 months  Tubin every 3 months  Headgear: 1 every 6 months  Disposable Heated Humidification Chamber: 1 every 6 months  Chin Strap: 1 every 6 months     If you are having problems using your CPAP/BiPAP device due to discomfort, congestion, air leak, etc. please call the office for help.  Do NOT just stop wearing your device.  It is important for your health!      I do not recommend CPAP cleaning machines like SoClean devices.  They use OZONE to clean which is harmful to both you and your device.  As of 2020, manufacturers of CPAP machines will not cover the warranty if you use a cleaning machine.        SLEEP HYGIENE    -Sleep disruption is common, especially during times when you may feel emotionally overwhelmed. Anxiety, relentless replaying of the day's events, and heightened emotions may significantly interfere with your sleep. Lack of sleep robs you of  needed rest, making management of your illness more difficult.  -Bringing sleep patterns under control and working at a consistent stable pattern is very important to illness management. You need your rest.  -The most common cause of insomnia is a change in our daily routine. For example traveling, change in work hours, disruption of other behaviors (eating, exercise, leisure, etc.) relationship conflicts may cause sleep problems.    DO:  1. Go to bed at the same time each day. (Consistent on weeks and weekends).   2. Get up from bed at the same time each day. (Consistent on weeks and weekends).   3. Get regular exercise each day, preferably in the morning. There is good evidence that regular exercise improves restful sleep. This includes stretching and aerobic exercise. Avoid exercise right before bed.   4. Get regular exposure to outdoor or bright lights during the daytime.  5. Keep the temperature in your bedroom comfortable. (More cooler than warmer).   6. Keep the bedroom quiet when sleeping.  7. Keep the bedroom dark enough to facilitate sleep.  8. Use your bed only for sleep and sex.  9. Take medications as directed. It is often helpful to take prescribed sleeping pills just before bedtime, so they are causing drowsiness when you lie down or 10 hours before getting up, to avoid daytime drowsiness.  10. Use a relaxation exercise just before going to sleep such as muscle relaxation, imagery, massage, warm bath etc.  11. Keep your feet and hands warm. Wear warm socks and/or mittens or gloves to bed.  12. Have regular mealtimes/snacks.   13. Limit liquids and avoid heavy foods close to bedtime.   14. Avoid napping.   15. Avoid clock watching.   16. Try not to take problems to bed, schedule these discussions and worry time for earlier in the day.     DO NOT:  1. Exercise just before going to bed.  2. Engage in stimulating activity just before bed, such as playing a competitive game, watching an exciting program on  television or a movie, or having an important discussion with a loved one.  3. Have caffeine in the evening (coffee, many teas, chocolate, soda, etc.) Avoid caffeine after 2:00pm.   4. Watch television or use portable electronic devices in bed before sleeping.  5. Use alcohol to help you sleep.  6. Go to bed too hungry or too full.  7. Take another person's sleeping pills.  8. Take over-the-counter sleeping pills without your doctor's knowledge. Tolerance can develop rapidly with these medications. Diphenhydramine (an ingredient commonly found in over-the-counter sleep medications) can have serious side effects for elderly patients.  9. Take daytime naps.  10. Command yourself to go to sleep. This only makes your mind and body more alert.    NOTE:  - If you lie in bed awake for more than 20-30 minutes, get up, go to a different room (or different part of the bedroom), participate in a quiet activity (example: non-excitable reading or television), then return to bed when you feel sleepy. Preferably a dimly lit room.   - Do this as many times during the night as needed.    negative...

## 2024-04-30 ENCOUNTER — APPOINTMENT (OUTPATIENT)
Dept: INTERNAL MEDICINE | Facility: CLINIC | Age: 69
End: 2024-04-30

## 2024-06-06 ENCOUNTER — APPOINTMENT (OUTPATIENT)
Dept: INTERNAL MEDICINE | Facility: CLINIC | Age: 69
End: 2024-06-06
Payer: COMMERCIAL

## 2024-06-06 VITALS
WEIGHT: 228 LBS | HEART RATE: 85 BPM | BODY MASS INDEX: 30.88 KG/M2 | HEIGHT: 72 IN | DIASTOLIC BLOOD PRESSURE: 80 MMHG | OXYGEN SATURATION: 98 % | RESPIRATION RATE: 13 BRPM | SYSTOLIC BLOOD PRESSURE: 120 MMHG

## 2024-06-06 DIAGNOSIS — K21.9 GASTRO-ESOPHAGEAL REFLUX DISEASE W/OUT ESOPHAGITIS: ICD-10-CM

## 2024-06-06 DIAGNOSIS — Z79.899 OTHER LONG TERM (CURRENT) DRUG THERAPY: ICD-10-CM

## 2024-06-06 DIAGNOSIS — R73.01 IMPAIRED FASTING GLUCOSE: ICD-10-CM

## 2024-06-06 DIAGNOSIS — E78.5 HYPERLIPIDEMIA, UNSPECIFIED: ICD-10-CM

## 2024-06-06 DIAGNOSIS — I10 ESSENTIAL (PRIMARY) HYPERTENSION: ICD-10-CM

## 2024-06-06 PROCEDURE — G2211 COMPLEX E/M VISIT ADD ON: CPT

## 2024-06-06 PROCEDURE — 99214 OFFICE O/P EST MOD 30 MIN: CPT

## 2024-06-06 PROCEDURE — 36415 COLL VENOUS BLD VENIPUNCTURE: CPT

## 2024-06-06 RX ORDER — SPIRONOLACTONE 25 MG/1
25 TABLET ORAL DAILY
Qty: 30 | Refills: 5 | Status: DISCONTINUED | COMMUNITY
Start: 2022-04-12 | End: 2024-06-06

## 2024-06-06 RX ORDER — HYDROCORTISONE 25 MG/G
2.5 CREAM TOPICAL
Qty: 1 | Refills: 2 | Status: DISCONTINUED | COMMUNITY
Start: 2022-08-18 | End: 2024-06-06

## 2024-06-06 RX ORDER — PAPAVERINE HYDROCHLORIDE 30 MG/ML
30 INJECTION, SOLUTION INTRAVENOUS
Qty: 5 | Refills: 6 | Status: DISCONTINUED | COMMUNITY
Start: 2023-11-30 | End: 2024-06-06

## 2024-06-06 RX ORDER — PAPAVERINE HYDROCHLORIDE 30 MG/ML
30 INJECTION, SOLUTION INTRAVENOUS
Qty: 10 | Refills: 4 | Status: DISCONTINUED | COMMUNITY
Start: 2023-11-29 | End: 2024-06-06

## 2024-06-06 NOTE — HISTORY OF PRESENT ILLNESS
[de-identified] : Patient presents as follow-up - on losartan for hypertension -On Lipitor for hyperlipidemia -Trying to improve his sugar dietarily -On meloxicam/Flexeril x 1 week for right low back pain, treated by orthopedic, sx better/improving

## 2024-06-06 NOTE — ASSESSMENT
[FreeTextEntry1] : Venipuncture done in our office, Labs sent out. Patient advised to continue present medications with diet/exercise and specialists followup. Patient  will return to the office in 3-4 months for CP   colonoscopy 10/2022 Endoscopy 10/2022 specialists include 1. Orthopedic-Dr. Carranza/Dr. Ortiz 2. Cardiology-Dr. Lucas 3. Colorectal-Dr. Santos 4. -Dr. Guerra 5. Ophthalmology-Dr. Wright 6.Derm-Dr. iniguez 7.EYE-Dr. Wright 8.ENT-Dr. Donato 9.Surgeon-Dr. Maher TDAP vax d/w pt/+ got covid vaccines  echo via cardio CT lung=N/A.

## 2024-06-07 ENCOUNTER — TRANSCRIPTION ENCOUNTER (OUTPATIENT)
Age: 69
End: 2024-06-07

## 2024-06-07 LAB
ALBUMIN SERPL ELPH-MCNC: 4.5 G/DL
ALP BLD-CCNC: 75 U/L
ALT SERPL-CCNC: 14 U/L
ANION GAP SERPL CALC-SCNC: 12 MMOL/L
AST SERPL-CCNC: 26 U/L
BASOPHILS # BLD AUTO: 0.02 K/UL
BASOPHILS NFR BLD AUTO: 0.4 %
BILIRUB SERPL-MCNC: 0.6 MG/DL
BUN SERPL-MCNC: 14 MG/DL
CALCIUM SERPL-MCNC: 9.4 MG/DL
CHLORIDE SERPL-SCNC: 102 MMOL/L
CHOLEST SERPL-MCNC: 147 MG/DL
CO2 SERPL-SCNC: 24 MMOL/L
CREAT SERPL-MCNC: 1.25 MG/DL
EGFR: 63 ML/MIN/1.73M2
EOSINOPHIL # BLD AUTO: 0.25 K/UL
EOSINOPHIL NFR BLD AUTO: 5.5 %
ESTIMATED AVERAGE GLUCOSE: 97 MG/DL
GLUCOSE SERPL-MCNC: 115 MG/DL
HBA1C MFR BLD HPLC: 5 %
HCT VFR BLD CALC: 39.9 %
HDLC SERPL-MCNC: 51 MG/DL
HGB BLD-MCNC: 13.6 G/DL
IMM GRANULOCYTES NFR BLD AUTO: 0.2 %
LDLC SERPL CALC-MCNC: 82 MG/DL
LYMPHOCYTES # BLD AUTO: 1.09 K/UL
LYMPHOCYTES NFR BLD AUTO: 23.9 %
MAGNESIUM SERPL-MCNC: 1.9 MG/DL
MAN DIFF?: NORMAL
MCHC RBC-ENTMCNC: 30.8 PG
MCHC RBC-ENTMCNC: 34.1 GM/DL
MCV RBC AUTO: 90.3 FL
MONOCYTES # BLD AUTO: 0.59 K/UL
MONOCYTES NFR BLD AUTO: 12.9 %
NEUTROPHILS # BLD AUTO: 2.6 K/UL
NEUTROPHILS NFR BLD AUTO: 57.1 %
NONHDLC SERPL-MCNC: 96 MG/DL
PLATELET # BLD AUTO: 203 K/UL
POTASSIUM SERPL-SCNC: 4.4 MMOL/L
PROT SERPL-MCNC: 7.4 G/DL
RBC # BLD: 4.42 M/UL
RBC # FLD: 12.4 %
SODIUM SERPL-SCNC: 138 MMOL/L
TRIGL SERPL-MCNC: 76 MG/DL
WBC # FLD AUTO: 4.56 K/UL

## 2024-06-12 ENCOUNTER — APPOINTMENT (OUTPATIENT)
Dept: INTERNAL MEDICINE | Facility: CLINIC | Age: 69
End: 2024-06-12
Payer: COMMERCIAL

## 2024-06-12 VITALS
BODY MASS INDEX: 30.75 KG/M2 | WEIGHT: 227 LBS | DIASTOLIC BLOOD PRESSURE: 80 MMHG | SYSTOLIC BLOOD PRESSURE: 138 MMHG | HEIGHT: 72 IN | OXYGEN SATURATION: 96 % | RESPIRATION RATE: 14 BRPM | HEART RATE: 103 BPM

## 2024-06-12 DIAGNOSIS — R10.9 UNSPECIFIED ABDOMINAL PAIN: ICD-10-CM

## 2024-06-12 DIAGNOSIS — S39.012A STRAIN OF MUSCLE, FASCIA AND TENDON OF LOWER BACK, INITIAL ENCOUNTER: ICD-10-CM

## 2024-06-12 PROCEDURE — G2211 COMPLEX E/M VISIT ADD ON: CPT

## 2024-06-12 PROCEDURE — 99213 OFFICE O/P EST LOW 20 MIN: CPT

## 2024-06-12 RX ORDER — TADALAFIL 5 MG/1
5 TABLET ORAL
Qty: 90 | Refills: 3 | Status: ACTIVE | COMMUNITY
Start: 2024-06-12 | End: 1900-01-01

## 2024-06-17 PROBLEM — R10.9 ABDOMINAL PAIN, RIGHT LATERAL: Status: ACTIVE | Noted: 2024-06-17

## 2024-06-17 NOTE — ASSESSMENT
[FreeTextEntry1] : Patient signs and symptoms most compatible with right lumbar muscle strain improved about 75% on meloxicam and cyclobenzaprine therefore recommended to continue. No clinical evidence of abdominal issues or kidney issues. Patient reassured but told if symptoms continue or change to call. Definitely told he does not need a CAT scan but offered abdominal ultrasound but he currently declines and states he will see how the next week or so goes.

## 2024-06-17 NOTE — PHYSICAL EXAM
[No Acute Distress] : no acute distress [No Respiratory Distress] : no respiratory distress  [No Accessory Muscle Use] : no accessory muscle use [Normal Rate] : normal rate  [Soft] : abdomen soft [Non Tender] : non-tender [Non-distended] : non-distended [No Masses] : no abdominal mass palpated [No HSM] : no HSM [No Focal Deficits] : no focal deficits [Normal Affect] : the affect was normal [de-identified] : Minimal tenderness to palpation right upper lumbar paraspinal area.  Area is below CVA.

## 2024-06-17 NOTE — ADDENDUM
[FreeTextEntry1] : June 17, 2024: The patient called stating that his symptoms continue and at times is sharper still usually related to movement but also somewhat towards the right upper quadrant.  Request imaging.  Initially we will start with abdominal ultrasound which has been ordered.

## 2024-06-17 NOTE — HISTORY OF PRESENT ILLNESS
[FreeTextEntry8] : The patient presents for an acute visit with persistent but improving right lower back pain. The patient states that he started about 2 weeks ago with right upper lumbar back pain which started without incident such as trauma, fall or lifting something.  Pain would increase with certain movements such as bending or twisting.  No rash.   The pain persisted therefore he saw orthopedic who felt that he had a low back strain therefore treated the patient with cyclobenzaprine and meloxicam which he continues and is about 75% improved.  He states the orthopedist and friends suggested maybe there is another issue going on such as appendix or kidney issues.  Patient with no abdominal symptoms.  No urinary symptoms.

## 2024-06-18 ENCOUNTER — APPOINTMENT (OUTPATIENT)
Dept: ULTRASOUND IMAGING | Facility: CLINIC | Age: 69
End: 2024-06-18
Payer: COMMERCIAL

## 2024-06-18 ENCOUNTER — OUTPATIENT (OUTPATIENT)
Dept: OUTPATIENT SERVICES | Facility: HOSPITAL | Age: 69
LOS: 1 days | End: 2024-06-18
Payer: COMMERCIAL

## 2024-06-18 DIAGNOSIS — H40.11X0 PRIMARY OPEN-ANGLE GLAUCOMA, STAGE UNSPECIFIED: Chronic | ICD-10-CM

## 2024-06-18 DIAGNOSIS — Z98.49 CATARACT EXTRACTION STATUS, UNSPECIFIED EYE: Chronic | ICD-10-CM

## 2024-06-18 DIAGNOSIS — R10.9 UNSPECIFIED ABDOMINAL PAIN: ICD-10-CM

## 2024-06-18 DIAGNOSIS — Z98.890 OTHER SPECIFIED POSTPROCEDURAL STATES: Chronic | ICD-10-CM

## 2024-06-18 PROCEDURE — 76700 US EXAM ABDOM COMPLETE: CPT

## 2024-06-18 PROCEDURE — 76700 US EXAM ABDOM COMPLETE: CPT | Mod: 26

## 2024-08-19 NOTE — PHYSICAL EXAM
[General Appearance - Alert] : alert [General Appearance - In No Acute Distress] : in no acute distress [Sclera] : the sclera and conjunctiva were normal [PERRL With Normal Accommodation] : pupils were equal in size, round, and reactive to light [Extraocular Movements] : extraocular movements were intact [Outer Ear] : the ears and nose were normal in appearance [Oropharynx] : the oropharynx was normal [Neck Appearance] : the appearance of the neck was normal [Neck Cervical Mass (___cm)] : no neck mass was observed [Jugular Venous Distention Increased] : there was no jugular-venous distention [Thyroid Diffuse Enlargement] : the thyroid was not enlarged [Thyroid Nodule] : there were no palpable thyroid nodules [Auscultation Breath Sounds / Voice Sounds] : lungs were clear to auscultation bilaterally [Heart Rate And Rhythm] : heart rate was normal and rhythm regular [Heart Sounds] : normal S1 and S2 [Heart Sounds Gallop] : no gallops [Murmurs] : no murmurs [Heart Sounds Pericardial Friction Rub] : no pericardial rub [Arterial Pulses Carotid] : carotid pulses were normal with no bruits [Abdominal Aorta] : the abdominal aorta was normal [Arterial Pulses Femoral] : femoral pulses were normal without bruits [Full Pulse] : the pedal pulses are present [Edema] : there was no peripheral edema [Veins - Varicosity Changes] : there were no varicosital changes [Bowel Sounds] : normal bowel sounds [Abdomen Soft] : soft [Abdomen Tenderness] : non-tender [Abdomen Mass (___ Cm)] : no abdominal mass palpated [Cervical Lymph Nodes Enlarged Posterior Bilaterally] : posterior cervical [Cervical Lymph Nodes Enlarged Anterior Bilaterally] : anterior cervical [Supraclavicular Lymph Nodes Enlarged Bilaterally] : supraclavicular [Axillary Lymph Nodes Enlarged Bilaterally] : axillary [Femoral Lymph Nodes Enlarged Bilaterally] : femoral [Inguinal Lymph Nodes Enlarged Bilaterally] : inguinal [No Spinal Tenderness] : no spinal tenderness [Abnormal Walk] : normal gait [Nail Clubbing] : no clubbing  or cyanosis of the fingernails [Musculoskeletal - Swelling] : no joint swelling seen [Motor Tone] : muscle strength and tone were normal [Skin Color & Pigmentation] : normal skin color and pigmentation [Skin Turgor] : normal skin turgor [] : no rash [Cranial Nerves] : cranial nerves 2-12 were intact [No Focal Deficits] : no focal deficits [Oriented To Time, Place, And Person] : oriented to person, place, and time [Impaired Insight] : insight and judgment were intact [Affect] : the affect was normal

## 2024-08-20 ENCOUNTER — APPOINTMENT (OUTPATIENT)
Dept: INTERNAL MEDICINE | Facility: CLINIC | Age: 69
End: 2024-08-20
Payer: COMMERCIAL

## 2024-08-20 VITALS
HEIGHT: 72 IN | OXYGEN SATURATION: 97 % | RESPIRATION RATE: 15 BRPM | BODY MASS INDEX: 30.2 KG/M2 | WEIGHT: 223 LBS | HEART RATE: 87 BPM | DIASTOLIC BLOOD PRESSURE: 76 MMHG | SYSTOLIC BLOOD PRESSURE: 120 MMHG

## 2024-08-20 DIAGNOSIS — Z86.010 PERSONAL HISTORY OF COLONIC POLYPS: ICD-10-CM

## 2024-08-20 DIAGNOSIS — N40.1 BENIGN PROSTATIC HYPERPLASIA WITH LOWER URINARY TRACT SYMPMS: ICD-10-CM

## 2024-08-20 DIAGNOSIS — E78.5 HYPERLIPIDEMIA, UNSPECIFIED: ICD-10-CM

## 2024-08-20 DIAGNOSIS — R73.01 IMPAIRED FASTING GLUCOSE: ICD-10-CM

## 2024-08-20 DIAGNOSIS — Z00.00 ENCOUNTER FOR GENERAL ADULT MEDICAL EXAMINATION W/OUT ABNORMAL FINDINGS: ICD-10-CM

## 2024-08-20 DIAGNOSIS — I10 ESSENTIAL (PRIMARY) HYPERTENSION: ICD-10-CM

## 2024-08-20 DIAGNOSIS — E53.8 DEFICIENCY OF OTHER SPECIFIED B GROUP VITAMINS: ICD-10-CM

## 2024-08-20 DIAGNOSIS — N13.8 BENIGN PROSTATIC HYPERPLASIA WITH LOWER URINARY TRACT SYMPMS: ICD-10-CM

## 2024-08-20 DIAGNOSIS — Z23 ENCOUNTER FOR IMMUNIZATION: ICD-10-CM

## 2024-08-20 DIAGNOSIS — E66.3 OVERWEIGHT: ICD-10-CM

## 2024-08-20 DIAGNOSIS — Z79.899 OTHER LONG TERM (CURRENT) DRUG THERAPY: ICD-10-CM

## 2024-08-20 PROCEDURE — 36415 COLL VENOUS BLD VENIPUNCTURE: CPT

## 2024-08-20 PROCEDURE — 90471 IMMUNIZATION ADMIN: CPT

## 2024-08-20 PROCEDURE — 99397 PER PM REEVAL EST PAT 65+ YR: CPT | Mod: 25

## 2024-08-20 PROCEDURE — 90715 TDAP VACCINE 7 YRS/> IM: CPT

## 2024-08-20 NOTE — HEALTH RISK ASSESSMENT
[Yes] : Yes [2 - 3 times a week (3 pts)] : 2 - 3  times a week (3 points) [1 or 2 (0 pts)] : 1 or 2 (0 points) [Never (0 pts)] : Never (0 points) [No] : In the past 12 months have you used drugs other than those required for medical reasons? No [No falls in past year] : Patient reported no falls in the past year [0] : 2) Feeling down, depressed, or hopeless: Not at all (0) [PHQ-2 Negative - No further assessment needed] : PHQ-2 Negative - No further assessment needed [Former] : Former [10-14] : 10-14 [> 15 Years] : > 15 Years [None] : None [With Significant Other] : lives with significant other [Employed] : employed [College] : College [] :  [# Of Children ___] : has [unfilled] children [Fully functional (bathing, dressing, toileting, transferring, walking, feeding)] : Fully functional (bathing, dressing, toileting, transferring, walking, feeding) [Fully functional (using the telephone, shopping, preparing meals, housekeeping, doing laundry, using] : Fully functional and needs no help or supervision to perform IADLs (using the telephone, shopping, preparing meals, housekeeping, doing laundry, using transportation, managing medications and managing finances) [Smoke Detector] : smoke detector [Carbon Monoxide Detector] : carbon monoxide detector [Seat Belt] :  uses seat belt [Sunscreen] : uses sunscreen [Reviewed no changes] : Reviewed, no changes [Designated Healthcare Proxy] : Designated healthcare proxy [Name: ___] : Health Care Proxy's Name: [unfilled]  [Very Good] : ~his/her~ current health as very good [NO] : No

## 2024-08-21 ENCOUNTER — TRANSCRIPTION ENCOUNTER (OUTPATIENT)
Age: 69
End: 2024-08-21

## 2024-08-21 LAB
ALBUMIN SERPL ELPH-MCNC: 4.6 G/DL
ALP BLD-CCNC: 89 U/L
ALT SERPL-CCNC: 19 U/L
ANION GAP SERPL CALC-SCNC: 14 MMOL/L
APPEARANCE: CLEAR
AST SERPL-CCNC: 34 U/L
BACTERIA: NEGATIVE /HPF
BASOPHILS # BLD AUTO: 0.02 K/UL
BASOPHILS NFR BLD AUTO: 0.5 %
BILIRUB SERPL-MCNC: 1.2 MG/DL
BILIRUBIN URINE: NEGATIVE
BLOOD URINE: NEGATIVE
BUN SERPL-MCNC: 10 MG/DL
CALCIUM SERPL-MCNC: 8.9 MG/DL
CAST: 0 /LPF
CHLORIDE SERPL-SCNC: 102 MMOL/L
CHOLEST SERPL-MCNC: 133 MG/DL
CO2 SERPL-SCNC: 24 MMOL/L
COLOR: YELLOW
CREAT SERPL-MCNC: 1.11 MG/DL
EGFR: 73 ML/MIN/1.73M2
EOSINOPHIL # BLD AUTO: 0.15 K/UL
EOSINOPHIL NFR BLD AUTO: 3.5 %
EPITHELIAL CELLS: 1 /HPF
ESTIMATED AVERAGE GLUCOSE: 94 MG/DL
GLUCOSE QUALITATIVE U: NEGATIVE MG/DL
GLUCOSE SERPL-MCNC: 116 MG/DL
HBA1C MFR BLD HPLC: 4.9 %
HCT VFR BLD CALC: 40.5 %
HDLC SERPL-MCNC: 51 MG/DL
HGB BLD-MCNC: 13.5 G/DL
IMM GRANULOCYTES NFR BLD AUTO: 0.2 %
KETONES URINE: NEGATIVE MG/DL
LDLC SERPL CALC-MCNC: 64 MG/DL
LEUKOCYTE ESTERASE URINE: ABNORMAL
LYMPHOCYTES # BLD AUTO: 1.41 K/UL
LYMPHOCYTES NFR BLD AUTO: 32.6 %
MAN DIFF?: NORMAL
MCHC RBC-ENTMCNC: 31.2 PG
MCHC RBC-ENTMCNC: 33.3 GM/DL
MCV RBC AUTO: 93.5 FL
MICROSCOPIC-UA: NORMAL
MONOCYTES # BLD AUTO: 0.48 K/UL
MONOCYTES NFR BLD AUTO: 11.1 %
NEUTROPHILS # BLD AUTO: 2.25 K/UL
NEUTROPHILS NFR BLD AUTO: 52.1 %
NITRITE URINE: NEGATIVE
NONHDLC SERPL-MCNC: 82 MG/DL
PH URINE: 6
PLATELET # BLD AUTO: 212 K/UL
POTASSIUM SERPL-SCNC: 3.5 MMOL/L
PROT SERPL-MCNC: 7.4 G/DL
PROTEIN URINE: NEGATIVE MG/DL
RBC # BLD: 4.33 M/UL
RBC # FLD: 12.1 %
RED BLOOD CELLS URINE: 1 /HPF
SODIUM SERPL-SCNC: 141 MMOL/L
SPECIFIC GRAVITY URINE: 1.02
TRIGL SERPL-MCNC: 93 MG/DL
UROBILINOGEN URINE: 1 MG/DL
VIT B12 SERPL-MCNC: 284 PG/ML
WBC # FLD AUTO: 4.32 K/UL
WHITE BLOOD CELLS URINE: 0 /HPF

## 2024-10-23 ENCOUNTER — APPOINTMENT (OUTPATIENT)
Dept: UROLOGY | Facility: CLINIC | Age: 69
End: 2024-10-23

## 2024-12-11 ENCOUNTER — NON-APPOINTMENT (OUTPATIENT)
Age: 69
End: 2024-12-11

## 2024-12-16 ENCOUNTER — APPOINTMENT (OUTPATIENT)
Dept: ORTHOPEDIC SURGERY | Facility: CLINIC | Age: 69
End: 2024-12-16
Payer: COMMERCIAL

## 2024-12-16 ENCOUNTER — NON-APPOINTMENT (OUTPATIENT)
Age: 69
End: 2024-12-16

## 2024-12-16 VITALS — HEIGHT: 72 IN | BODY MASS INDEX: 30.2 KG/M2 | WEIGHT: 223 LBS | RESPIRATION RATE: 16 BRPM

## 2024-12-16 DIAGNOSIS — M65.341 TRIGGER FINGER, RIGHT RING FINGER: ICD-10-CM

## 2024-12-16 DIAGNOSIS — R20.0 ANESTHESIA OF SKIN: ICD-10-CM

## 2024-12-16 DIAGNOSIS — M65.342 TRIGGER FINGER, LEFT RING FINGER: ICD-10-CM

## 2024-12-16 DIAGNOSIS — G56.21 LESION OF ULNAR NERVE, RIGHT UPPER LIMB: ICD-10-CM

## 2024-12-16 PROCEDURE — 20550 NJX 1 TENDON SHEATH/LIGAMENT: CPT | Mod: RT

## 2024-12-16 PROCEDURE — 99204 OFFICE O/P NEW MOD 45 MIN: CPT | Mod: 25

## 2024-12-16 PROCEDURE — 73110 X-RAY EXAM OF WRIST: CPT | Mod: 50

## 2024-12-18 ENCOUNTER — APPOINTMENT (OUTPATIENT)
Dept: SURGERY | Facility: CLINIC | Age: 69
End: 2024-12-18
Payer: COMMERCIAL

## 2024-12-18 VITALS
SYSTOLIC BLOOD PRESSURE: 122 MMHG | RESPIRATION RATE: 16 BRPM | OXYGEN SATURATION: 97 % | DIASTOLIC BLOOD PRESSURE: 70 MMHG | BODY MASS INDEX: 31.02 KG/M2 | TEMPERATURE: 87 F | HEIGHT: 72 IN | HEART RATE: 87 BPM | WEIGHT: 229 LBS

## 2024-12-18 DIAGNOSIS — K42.9 UMBILICAL HERNIA W/OUT OBSTRUCTION OR GANGRENE: ICD-10-CM

## 2024-12-18 DIAGNOSIS — E66.9 OBESITY, UNSPECIFIED: ICD-10-CM

## 2024-12-18 DIAGNOSIS — K43.2 INCISIONAL HERNIA W/OUT OBSTRUCTION OR GANGRENE: ICD-10-CM

## 2024-12-18 PROCEDURE — 99213 OFFICE O/P EST LOW 20 MIN: CPT

## 2025-01-07 ENCOUNTER — APPOINTMENT (OUTPATIENT)
Dept: UROLOGY | Facility: CLINIC | Age: 70
End: 2025-01-07
Payer: COMMERCIAL

## 2025-01-07 VITALS
DIASTOLIC BLOOD PRESSURE: 73 MMHG | WEIGHT: 229 LBS | BODY MASS INDEX: 31.02 KG/M2 | HEIGHT: 72 IN | SYSTOLIC BLOOD PRESSURE: 126 MMHG | TEMPERATURE: 97.8 F | RESPIRATION RATE: 16 BRPM | HEART RATE: 81 BPM | OXYGEN SATURATION: 98 %

## 2025-01-07 DIAGNOSIS — N13.8 BENIGN PROSTATIC HYPERPLASIA WITH LOWER URINARY TRACT SYMPMS: ICD-10-CM

## 2025-01-07 DIAGNOSIS — N52.9 MALE ERECTILE DYSFUNCTION, UNSPECIFIED: ICD-10-CM

## 2025-01-07 DIAGNOSIS — N40.1 BENIGN PROSTATIC HYPERPLASIA WITH LOWER URINARY TRACT SYMPMS: ICD-10-CM

## 2025-01-07 PROCEDURE — G2211 COMPLEX E/M VISIT ADD ON: CPT

## 2025-01-07 PROCEDURE — 99214 OFFICE O/P EST MOD 30 MIN: CPT

## 2025-01-08 LAB
APPEARANCE: CLEAR
BACTERIA: NEGATIVE /HPF
BILIRUBIN URINE: NEGATIVE
BLOOD URINE: NEGATIVE
CAST: 0 /LPF
COLOR: YELLOW
EPITHELIAL CELLS: 0 /HPF
GLUCOSE QUALITATIVE U: NEGATIVE MG/DL
KETONES URINE: NEGATIVE MG/DL
LEUKOCYTE ESTERASE URINE: NEGATIVE
MICROSCOPIC-UA: NORMAL
NITRITE URINE: NEGATIVE
PH URINE: 5.5
PROTEIN URINE: NEGATIVE MG/DL
RED BLOOD CELLS URINE: 1 /HPF
SPECIFIC GRAVITY URINE: 1.02
UROBILINOGEN URINE: 0.2 MG/DL
WHITE BLOOD CELLS URINE: 0 /HPF

## 2025-01-09 LAB
PSA FREE FLD-MCNC: 20 %
PSA FREE SERPL-MCNC: 0.42 NG/ML
PSA SERPL-MCNC: 2.13 NG/ML

## 2025-02-18 ENCOUNTER — APPOINTMENT (OUTPATIENT)
Dept: INTERNAL MEDICINE | Facility: CLINIC | Age: 70
End: 2025-02-18
Payer: COMMERCIAL

## 2025-02-18 VITALS
BODY MASS INDEX: 29.95 KG/M2 | RESPIRATION RATE: 13 BRPM | WEIGHT: 220.8 LBS | HEART RATE: 79 BPM | SYSTOLIC BLOOD PRESSURE: 125 MMHG | DIASTOLIC BLOOD PRESSURE: 80 MMHG

## 2025-02-18 DIAGNOSIS — K21.9 GASTRO-ESOPHAGEAL REFLUX DISEASE W/OUT ESOPHAGITIS: ICD-10-CM

## 2025-02-18 DIAGNOSIS — I10 ESSENTIAL (PRIMARY) HYPERTENSION: ICD-10-CM

## 2025-02-18 DIAGNOSIS — R73.01 IMPAIRED FASTING GLUCOSE: ICD-10-CM

## 2025-02-18 DIAGNOSIS — E78.5 HYPERLIPIDEMIA, UNSPECIFIED: ICD-10-CM

## 2025-02-18 DIAGNOSIS — Z79.899 OTHER LONG TERM (CURRENT) DRUG THERAPY: ICD-10-CM

## 2025-02-18 DIAGNOSIS — R68.89 OTHER GENERAL SYMPTOMS AND SIGNS: ICD-10-CM

## 2025-02-18 DIAGNOSIS — E53.8 DEFICIENCY OF OTHER SPECIFIED B GROUP VITAMINS: ICD-10-CM

## 2025-02-18 PROCEDURE — G2211 COMPLEX E/M VISIT ADD ON: CPT

## 2025-02-18 PROCEDURE — 36415 COLL VENOUS BLD VENIPUNCTURE: CPT

## 2025-02-18 PROCEDURE — 99214 OFFICE O/P EST MOD 30 MIN: CPT

## 2025-02-19 LAB
ALBUMIN SERPL ELPH-MCNC: 4.5 G/DL
ALP BLD-CCNC: 78 U/L
ALT SERPL-CCNC: 9 U/L
ANION GAP SERPL CALC-SCNC: 12 MMOL/L
AST SERPL-CCNC: 26 U/L
BASOPHILS # BLD AUTO: 0.01 K/UL
BASOPHILS NFR BLD AUTO: 0.2 %
BILIRUB SERPL-MCNC: 1.2 MG/DL
BUN SERPL-MCNC: 10 MG/DL
CALCIUM SERPL-MCNC: 9.1 MG/DL
CHLORIDE SERPL-SCNC: 103 MMOL/L
CHOLEST SERPL-MCNC: 155 MG/DL
CO2 SERPL-SCNC: 24 MMOL/L
CREAT SERPL-MCNC: 1.19 MG/DL
EGFR: 67 ML/MIN/1.73M2
EOSINOPHIL # BLD AUTO: 0.1 K/UL
EOSINOPHIL NFR BLD AUTO: 2.5 %
ESTIMATED AVERAGE GLUCOSE: 91 MG/DL
GLUCOSE SERPL-MCNC: 114 MG/DL
HBA1C MFR BLD HPLC: 4.8 %
HCT VFR BLD CALC: 42.9 %
HDLC SERPL-MCNC: 60 MG/DL
HGB BLD-MCNC: 14.2 G/DL
IMM GRANULOCYTES NFR BLD AUTO: 0.2 %
LDLC SERPL CALC-MCNC: 79 MG/DL
LYMPHOCYTES # BLD AUTO: 1.14 K/UL
LYMPHOCYTES NFR BLD AUTO: 28.1 %
MAGNESIUM SERPL-MCNC: 1.8 MG/DL
MAN DIFF?: NORMAL
MCHC RBC-ENTMCNC: 30.9 PG
MCHC RBC-ENTMCNC: 33.1 G/DL
MCV RBC AUTO: 93.5 FL
MONOCYTES # BLD AUTO: 0.64 K/UL
MONOCYTES NFR BLD AUTO: 15.8 %
NEUTROPHILS # BLD AUTO: 2.16 K/UL
NEUTROPHILS NFR BLD AUTO: 53.2 %
NONHDLC SERPL-MCNC: 95 MG/DL
PLATELET # BLD AUTO: 221 K/UL
POTASSIUM SERPL-SCNC: 3.7 MMOL/L
PROT SERPL-MCNC: 7.5 G/DL
RBC # BLD: 4.59 M/UL
RBC # FLD: 12.2 %
SODIUM SERPL-SCNC: 140 MMOL/L
TRIGL SERPL-MCNC: 88 MG/DL
VIT B12 SERPL-MCNC: 509 PG/ML
WBC # FLD AUTO: 4.06 K/UL

## 2025-04-10 ENCOUNTER — APPOINTMENT (OUTPATIENT)
Dept: INTERNAL MEDICINE | Facility: CLINIC | Age: 70
End: 2025-04-10
Payer: COMMERCIAL

## 2025-04-10 VITALS
BODY MASS INDEX: 30.48 KG/M2 | DIASTOLIC BLOOD PRESSURE: 78 MMHG | SYSTOLIC BLOOD PRESSURE: 130 MMHG | HEIGHT: 72 IN | HEART RATE: 81 BPM | RESPIRATION RATE: 13 BRPM | OXYGEN SATURATION: 98 % | WEIGHT: 225 LBS

## 2025-04-10 DIAGNOSIS — R68.89 OTHER GENERAL SYMPTOMS AND SIGNS: ICD-10-CM

## 2025-04-10 PROCEDURE — G2211 COMPLEX E/M VISIT ADD ON: CPT

## 2025-04-10 PROCEDURE — 99213 OFFICE O/P EST LOW 20 MIN: CPT

## 2025-04-10 RX ORDER — AZELASTINE HYDROCHLORIDE 137 UG/1
0.1 SPRAY, METERED NASAL TWICE DAILY
Qty: 1 | Refills: 1 | Status: ACTIVE | COMMUNITY
Start: 2025-04-10 | End: 1900-01-01

## 2025-04-10 RX ORDER — DESLORATADINE 5 MG/1
5 TABLET ORAL DAILY
Qty: 30 | Refills: 0 | Status: ACTIVE | COMMUNITY
Start: 2025-04-10 | End: 1900-01-01

## 2025-04-16 ENCOUNTER — APPOINTMENT (OUTPATIENT)
Dept: SURGERY | Facility: CLINIC | Age: 70
End: 2025-04-16

## 2025-07-08 ENCOUNTER — NON-APPOINTMENT (OUTPATIENT)
Age: 70
End: 2025-07-08

## 2025-07-09 ENCOUNTER — APPOINTMENT (OUTPATIENT)
Dept: UROLOGY | Facility: CLINIC | Age: 70
End: 2025-07-09
Payer: COMMERCIAL

## 2025-07-09 LAB
APPEARANCE: CLEAR
BACTERIA: NEGATIVE /HPF
BILIRUBIN URINE: NEGATIVE
BLOOD URINE: NEGATIVE
CAST: 0 /LPF
COLOR: YELLOW
EPITHELIAL CELLS: 1 /HPF
GLUCOSE QUALITATIVE U: NEGATIVE MG/DL
KETONES URINE: NEGATIVE MG/DL
LEUKOCYTE ESTERASE URINE: ABNORMAL
MICROSCOPIC-UA: NORMAL
NITRITE URINE: NEGATIVE
PH URINE: 6.5
PROTEIN URINE: NEGATIVE MG/DL
RED BLOOD CELLS URINE: 1 /HPF
SPECIFIC GRAVITY URINE: 1.01
UROBILINOGEN URINE: 0.2 MG/DL
WHITE BLOOD CELLS URINE: 0 /HPF

## 2025-07-09 PROCEDURE — G2211 COMPLEX E/M VISIT ADD ON: CPT

## 2025-07-09 PROCEDURE — 99215 OFFICE O/P EST HI 40 MIN: CPT

## 2025-07-10 LAB
PSA FREE FLD-MCNC: 18 %
PSA FREE SERPL-MCNC: 0.4 NG/ML
PSA SERPL-MCNC: 2.18 NG/ML
URINE CYTOLOGY: NORMAL

## 2025-07-23 ENCOUNTER — APPOINTMENT (OUTPATIENT)
Dept: OTOLARYNGOLOGY | Facility: CLINIC | Age: 70
End: 2025-07-23
Payer: COMMERCIAL

## 2025-07-23 VITALS
HEIGHT: 73 IN | BODY MASS INDEX: 29.55 KG/M2 | WEIGHT: 223 LBS | HEART RATE: 76 BPM | SYSTOLIC BLOOD PRESSURE: 108 MMHG | DIASTOLIC BLOOD PRESSURE: 66 MMHG

## 2025-07-23 DIAGNOSIS — J31.2 CHRONIC PHARYNGITIS: ICD-10-CM

## 2025-07-23 DIAGNOSIS — K21.9 GASTRO-ESOPHAGEAL REFLUX DISEASE W/OUT ESOPHAGITIS: ICD-10-CM

## 2025-07-23 DIAGNOSIS — R09.A2 FOREIGN BODY SENSATION, THROAT: ICD-10-CM

## 2025-07-23 PROCEDURE — 99203 OFFICE O/P NEW LOW 30 MIN: CPT | Mod: 25

## 2025-07-23 PROCEDURE — 31575 DIAGNOSTIC LARYNGOSCOPY: CPT

## 2025-09-11 ENCOUNTER — APPOINTMENT (OUTPATIENT)
Dept: INTERNAL MEDICINE | Facility: CLINIC | Age: 70
End: 2025-09-11
Payer: COMMERCIAL

## 2025-09-11 VITALS
WEIGHT: 217.13 LBS | BODY MASS INDEX: 28.78 KG/M2 | HEIGHT: 73 IN | OXYGEN SATURATION: 95 % | SYSTOLIC BLOOD PRESSURE: 120 MMHG | DIASTOLIC BLOOD PRESSURE: 64 MMHG | RESPIRATION RATE: 14 BRPM | HEART RATE: 87 BPM

## 2025-09-11 DIAGNOSIS — Z00.00 ENCOUNTER FOR GENERAL ADULT MEDICAL EXAMINATION W/OUT ABNORMAL FINDINGS: ICD-10-CM

## 2025-09-11 DIAGNOSIS — I10 ESSENTIAL (PRIMARY) HYPERTENSION: ICD-10-CM

## 2025-09-11 DIAGNOSIS — E78.5 HYPERLIPIDEMIA, UNSPECIFIED: ICD-10-CM

## 2025-09-11 DIAGNOSIS — N40.1 BENIGN PROSTATIC HYPERPLASIA WITH LOWER URINARY TRACT SYMPMS: ICD-10-CM

## 2025-09-11 DIAGNOSIS — E66.3 OVERWEIGHT: ICD-10-CM

## 2025-09-11 DIAGNOSIS — Z79.899 OTHER LONG TERM (CURRENT) DRUG THERAPY: ICD-10-CM

## 2025-09-11 DIAGNOSIS — R73.01 IMPAIRED FASTING GLUCOSE: ICD-10-CM

## 2025-09-11 DIAGNOSIS — K21.9 GASTRO-ESOPHAGEAL REFLUX DISEASE W/OUT ESOPHAGITIS: ICD-10-CM

## 2025-09-11 DIAGNOSIS — N28.1 CYST OF KIDNEY, ACQUIRED: ICD-10-CM

## 2025-09-11 DIAGNOSIS — E53.8 DEFICIENCY OF OTHER SPECIFIED B GROUP VITAMINS: ICD-10-CM

## 2025-09-11 DIAGNOSIS — N13.8 BENIGN PROSTATIC HYPERPLASIA WITH LOWER URINARY TRACT SYMPMS: ICD-10-CM

## 2025-09-11 PROCEDURE — 36415 COLL VENOUS BLD VENIPUNCTURE: CPT

## 2025-09-11 PROCEDURE — 99397 PER PM REEVAL EST PAT 65+ YR: CPT

## 2025-09-12 ENCOUNTER — TRANSCRIPTION ENCOUNTER (OUTPATIENT)
Age: 70
End: 2025-09-12

## 2025-09-12 LAB
ALBUMIN SERPL ELPH-MCNC: 4.5 G/DL
ALP BLD-CCNC: 75 U/L
ALT SERPL-CCNC: 20 U/L
ANION GAP SERPL CALC-SCNC: 15 MMOL/L
APPEARANCE: CLEAR
AST SERPL-CCNC: 31 U/L
BACTERIA: NEGATIVE /HPF
BASOPHILS # BLD AUTO: 0.01 K/UL
BASOPHILS NFR BLD AUTO: 0.3 %
BILIRUB SERPL-MCNC: 0.7 MG/DL
BILIRUBIN URINE: NEGATIVE
BLOOD URINE: NEGATIVE
BUN SERPL-MCNC: 14 MG/DL
CALCIUM SERPL-MCNC: 8.9 MG/DL
CAST: 0 /LPF
CHLORIDE SERPL-SCNC: 102 MMOL/L
CHOLEST SERPL-MCNC: 118 MG/DL
CO2 SERPL-SCNC: 23 MMOL/L
COLOR: YELLOW
CREAT SERPL-MCNC: 0.98 MG/DL
EGFRCR SERPLBLD CKD-EPI 2021: 84 ML/MIN/1.73M2
EOSINOPHIL # BLD AUTO: 0.13 K/UL
EOSINOPHIL NFR BLD AUTO: 3.4 %
EPITHELIAL CELLS: 1 /HPF
ESTIMATED AVERAGE GLUCOSE: 88 MG/DL
GLUCOSE QUALITATIVE U: NEGATIVE MG/DL
GLUCOSE SERPL-MCNC: 102 MG/DL
HBA1C MFR BLD HPLC: 4.7 %
HCT VFR BLD CALC: 38.7 %
HDLC SERPL-MCNC: 46 MG/DL
HGB BLD-MCNC: 13.3 G/DL
IMM GRANULOCYTES NFR BLD AUTO: 0.3 %
KETONES URINE: NEGATIVE MG/DL
LDLC SERPL-MCNC: 57 MG/DL
LEUKOCYTE ESTERASE URINE: NEGATIVE
LYMPHOCYTES # BLD AUTO: 1.07 K/UL
LYMPHOCYTES NFR BLD AUTO: 27.6 %
MAN DIFF?: NORMAL
MCHC RBC-ENTMCNC: 31.1 PG
MCHC RBC-ENTMCNC: 34.4 G/DL
MCV RBC AUTO: 90.4 FL
MICROSCOPIC-UA: NORMAL
MONOCYTES # BLD AUTO: 0.59 K/UL
MONOCYTES NFR BLD AUTO: 15.2 %
NEUTROPHILS # BLD AUTO: 2.07 K/UL
NEUTROPHILS NFR BLD AUTO: 53.2 %
NITRITE URINE: NEGATIVE
NONHDLC SERPL-MCNC: 72 MG/DL
PH URINE: 7.5
PLATELET # BLD AUTO: 187 K/UL
POTASSIUM SERPL-SCNC: 4.3 MMOL/L
PROT SERPL-MCNC: 7.2 G/DL
PROTEIN URINE: NEGATIVE MG/DL
RBC # BLD: 4.28 M/UL
RBC # FLD: 12.2 %
RED BLOOD CELLS URINE: 1 /HPF
SODIUM SERPL-SCNC: 140 MMOL/L
SPECIFIC GRAVITY URINE: 1.01
TRIGL SERPL-MCNC: 71 MG/DL
UROBILINOGEN URINE: 0.2 MG/DL
VIT B12 SERPL-MCNC: 456 PG/ML
WBC # FLD AUTO: 3.88 K/UL
WHITE BLOOD CELLS URINE: 0 /HPF

## 2025-09-15 ENCOUNTER — APPOINTMENT (OUTPATIENT)
Dept: ULTRASOUND IMAGING | Facility: CLINIC | Age: 70
End: 2025-09-15
Payer: COMMERCIAL

## 2025-09-15 PROCEDURE — 76536 US EXAM OF HEAD AND NECK: CPT | Mod: 26

## 2025-09-17 ENCOUNTER — APPOINTMENT (OUTPATIENT)
Dept: OTOLARYNGOLOGY | Facility: CLINIC | Age: 70
End: 2025-09-17
Payer: COMMERCIAL

## 2025-09-17 VITALS
HEIGHT: 73 IN | SYSTOLIC BLOOD PRESSURE: 133 MMHG | DIASTOLIC BLOOD PRESSURE: 85 MMHG | OXYGEN SATURATION: 100 % | HEART RATE: 69 BPM | WEIGHT: 217 LBS | BODY MASS INDEX: 28.76 KG/M2 | RESPIRATION RATE: 17 BRPM

## 2025-09-17 DIAGNOSIS — M54.2 CERVICALGIA: ICD-10-CM

## 2025-09-17 DIAGNOSIS — R22.1 LOCALIZED SWELLING, MASS AND LUMP, NECK: ICD-10-CM

## 2025-09-17 PROBLEM — R59.0 ENLARGED LYMPH NODE IN NECK: Status: ACTIVE | Noted: 2025-09-17

## 2025-09-17 PROCEDURE — 99204 OFFICE O/P NEW MOD 45 MIN: CPT

## 2025-09-17 RX ORDER — PSYLLIUM SEED (WITH DEXTROSE)
28 POWDER (GRAM) ORAL
Refills: 0 | Status: ACTIVE | COMMUNITY

## 2025-09-24 PROBLEM — S39.012A LOW BACK STRAIN: Status: ACTIVE | Noted: 2025-09-24

## (undated) DEVICE — FORCEP RADIAL JAW 4 240CM DISP

## (undated) DEVICE — STERIS DEFENDO 3-PIECE KIT (AIR/WATER, SUCTION & BIOPSY VALVES)